# Patient Record
Sex: FEMALE | Race: WHITE | URBAN - METROPOLITAN AREA
[De-identification: names, ages, dates, MRNs, and addresses within clinical notes are randomized per-mention and may not be internally consistent; named-entity substitution may affect disease eponyms.]

---

## 2019-09-23 ENCOUNTER — HISTORICAL (OUTPATIENT)
Dept: ADMINISTRATIVE | Facility: HOSPITAL | Age: 59
End: 2019-09-23

## 2019-10-28 LAB — RAPID GROUP A STREP (OHS): NEGATIVE

## 2020-01-21 ENCOUNTER — HISTORICAL (OUTPATIENT)
Dept: PREADMISSION TESTING | Facility: HOSPITAL | Age: 60
End: 2020-01-21

## 2020-01-21 LAB
ABS NEUT (OLG): 4.87 X10(3)/MCL (ref 2.1–9.2)
ALBUMIN SERPL-MCNC: 3.3 GM/DL (ref 3.4–5)
ALBUMIN/GLOB SERPL: 0.9 {RATIO}
ALP SERPL-CCNC: 162 UNIT/L (ref 38–126)
ALT SERPL-CCNC: 34 UNIT/L (ref 12–78)
AST SERPL-CCNC: 28 UNIT/L (ref 15–37)
BASOPHILS # BLD AUTO: 0 X10(3)/MCL (ref 0–0.2)
BASOPHILS NFR BLD AUTO: 0 %
BILIRUB SERPL-MCNC: 0.6 MG/DL (ref 0.2–1)
BILIRUBIN DIRECT+TOT PNL SERPL-MCNC: 0.2 MG/DL (ref 0–0.2)
BILIRUBIN DIRECT+TOT PNL SERPL-MCNC: 0.4 MG/DL (ref 0–0.8)
BUN SERPL-MCNC: 9 MG/DL (ref 7–18)
CALCIUM SERPL-MCNC: 8.9 MG/DL (ref 8.5–10.1)
CHLORIDE SERPL-SCNC: 99 MMOL/L (ref 98–107)
CO2 SERPL-SCNC: 31 MMOL/L (ref 21–32)
CREAT SERPL-MCNC: 0.66 MG/DL (ref 0.55–1.02)
EOSINOPHIL # BLD AUTO: 0 X10(3)/MCL (ref 0–0.9)
EOSINOPHIL NFR BLD AUTO: 0 %
ERYTHROCYTE [DISTWIDTH] IN BLOOD BY AUTOMATED COUNT: 20.1 % (ref 11.5–17)
GLOBULIN SER-MCNC: 3.6 GM/DL (ref 2.4–3.5)
GLUCOSE SERPL-MCNC: 100 MG/DL (ref 74–106)
HCT VFR BLD AUTO: 50.1 % (ref 37–47)
HGB BLD-MCNC: 15.5 GM/DL (ref 12–16)
LYMPHOCYTES # BLD AUTO: 1.5 X10(3)/MCL (ref 0.6–4.6)
LYMPHOCYTES NFR BLD AUTO: 21 %
MCH RBC QN AUTO: 27.3 PG (ref 27–31)
MCHC RBC AUTO-ENTMCNC: 30.9 GM/DL (ref 33–36)
MCV RBC AUTO: 88.2 FL (ref 80–94)
MONOCYTES # BLD AUTO: 0.9 X10(3)/MCL (ref 0.1–1.3)
MONOCYTES NFR BLD AUTO: 12 %
NEUTROPHILS # BLD AUTO: 4.87 X10(3)/MCL (ref 2.1–9.2)
NEUTROPHILS NFR BLD AUTO: 66 %
PLATELET # BLD AUTO: 271 X10(3)/MCL (ref 130–400)
PMV BLD AUTO: 9.5 FL (ref 9.4–12.4)
POTASSIUM SERPL-SCNC: 3.9 MMOL/L (ref 3.5–5.1)
PROT SERPL-MCNC: 6.9 GM/DL (ref 6.4–8.2)
RBC # BLD AUTO: 5.68 X10(6)/MCL (ref 4.2–5.4)
SODIUM SERPL-SCNC: 137 MMOL/L (ref 136–145)
WBC # SPEC AUTO: 7.4 X10(3)/MCL (ref 4.5–11.5)

## 2020-07-14 ENCOUNTER — HISTORICAL (OUTPATIENT)
Dept: RESPIRATORY THERAPY | Facility: HOSPITAL | Age: 60
End: 2020-07-14

## 2020-10-14 ENCOUNTER — HISTORICAL (OUTPATIENT)
Dept: ADMINISTRATIVE | Facility: HOSPITAL | Age: 60
End: 2020-10-14

## 2020-10-14 LAB
ABS NEUT (OLG): 5.85 X10(3)/MCL (ref 2.1–9.2)
BASOPHILS # BLD AUTO: 0 X10(3)/MCL (ref 0–0.2)
BASOPHILS NFR BLD AUTO: 0.1 %
EOSINOPHIL # BLD AUTO: 0.1 X10(3)/MCL (ref 0–0.9)
EOSINOPHIL NFR BLD AUTO: 1 %
ERYTHROCYTE [DISTWIDTH] IN BLOOD BY AUTOMATED COUNT: 13.1 % (ref 11.5–17)
HCT VFR BLD AUTO: 45.8 % (ref 37–47)
HGB BLD-MCNC: 15.5 GM/DL (ref 12–16)
LYMPHOCYTES # BLD AUTO: 1.9 X10(3)/MCL (ref 0.6–4.6)
LYMPHOCYTES NFR BLD AUTO: 21.5 %
MCH RBC QN AUTO: 31.9 PG (ref 27–31)
MCHC RBC AUTO-ENTMCNC: 33.8 GM/DL (ref 33–36)
MCV RBC AUTO: 94.2 FL (ref 80–94)
MONOCYTES # BLD AUTO: 0.9 X10(3)/MCL (ref 0.1–1.3)
MONOCYTES NFR BLD AUTO: 10 %
NEUTROPHILS # BLD AUTO: 5.8 X10(3)/MCL (ref 2.1–9.2)
NEUTROPHILS NFR BLD AUTO: 67.3 %
PLATELET # BLD AUTO: 228 X10(3)/MCL (ref 130–400)
PMV BLD AUTO: 8.3 FL (ref 9.4–12.4)
RBC # BLD AUTO: 4.86 X10(6)/MCL (ref 4.2–5.4)
WBC # SPEC AUTO: 8.7 X10(3)/MCL (ref 4.5–11.5)

## 2021-05-25 ENCOUNTER — HISTORICAL (OUTPATIENT)
Dept: ADMINISTRATIVE | Facility: HOSPITAL | Age: 61
End: 2021-05-25

## 2021-05-25 LAB
ABS NEUT (OLG): 6.47 X10(3)/MCL (ref 2.1–9.2)
BASOPHILS # BLD AUTO: 0 X10(3)/MCL (ref 0–0.2)
BASOPHILS NFR BLD AUTO: 0 %
EOSINOPHIL # BLD AUTO: 0.1 X10(3)/MCL (ref 0–0.9)
EOSINOPHIL NFR BLD AUTO: 1.4 %
ERYTHROCYTE [DISTWIDTH] IN BLOOD BY AUTOMATED COUNT: 12.9 % (ref 11.5–17)
HCT VFR BLD AUTO: 51.2 % (ref 37–47)
HGB BLD-MCNC: 17.1 GM/DL (ref 12–16)
LYMPHOCYTES # BLD AUTO: 1.3 X10(3)/MCL (ref 0.6–4.6)
LYMPHOCYTES NFR BLD AUTO: 14.6 %
MCH RBC QN AUTO: 32.3 PG (ref 27–31)
MCHC RBC AUTO-ENTMCNC: 33.4 GM/DL (ref 33–36)
MCV RBC AUTO: 96.6 FL (ref 80–94)
MONOCYTES # BLD AUTO: 0.9 X10(3)/MCL (ref 0.1–1.3)
MONOCYTES NFR BLD AUTO: 9.9 %
NEUTROPHILS # BLD AUTO: 6.5 X10(3)/MCL (ref 2.1–9.2)
NEUTROPHILS NFR BLD AUTO: 73.9 %
PLATELET # BLD AUTO: 189 X10(3)/MCL (ref 130–400)
PMV BLD AUTO: 8.5 FL (ref 9.4–12.4)
RBC # BLD AUTO: 5.3 X10(6)/MCL (ref 4.2–5.4)
WBC # SPEC AUTO: 8.8 X10(3)/MCL (ref 4.5–11.5)

## 2022-04-09 ENCOUNTER — HISTORICAL (OUTPATIENT)
Dept: ADMINISTRATIVE | Facility: HOSPITAL | Age: 62
End: 2022-04-09

## 2022-04-27 VITALS
DIASTOLIC BLOOD PRESSURE: 66 MMHG | SYSTOLIC BLOOD PRESSURE: 121 MMHG | BODY MASS INDEX: 22.77 KG/M2 | OXYGEN SATURATION: 92 % | HEIGHT: 67 IN | WEIGHT: 145.06 LBS

## 2022-04-30 NOTE — PROGRESS NOTES
Patient:   Shaniqua Rodriguez            MRN: [id number]            FIN: [id number]               Age:   60 years     Sex:  Female     :  1960   Associated Diagnoses:   Polycythemia   Author:   Yuli Rich MD      Referring physician: Dr. Kate Almeida  Reason for Referral: Polycythemia    Secondary Polycythemia--2020    H/H:  20--16.9/50.2  20--16.7/48.5  20--16.5/47.1  10/14/20--15.5/45.8  21--17.1/51.2    Work-up:  10/14/20--erythropoietin level 6.1, JAK2 V617F, Exons 12-15, CALR, MPL mutations negative.         Visit Information   Visit type:  Scheduled follow-up.    Accompanied by:  No one.       Chief Complaint   2021 8:59 CDT       scheduled follow-up visit        Interval History   Current complaint.   Patient presents for follow-up of secondary polycythemia. She is still smoking, though trying to quit. She reports having another CT chest to check a lung nodule soon, ordered by Dr. Almeida. Denies any increasing SOB. H/H is higher today. O2 saturation is low. She does not have a pulmonologist that she follows with.      Review of Systems   Constitutional:  No fever, No chills, No weakness, No fatigue.    Eye:  No recent visual problem.    Ear/Nose/Mouth/Throat:  No decreased hearing, No nasal congestion, No sore throat.    Respiratory:  No shortness of breath, No cough, No wheezing.    Cardiovascular:  No chest pain, No palpitations, No peripheral edema.    Gastrointestinal:  No nausea, No vomiting, No diarrhea, No constipation, No abdominal pain.    Hematology/Lymphatics:  No bruising tendency, No bleeding tendency.    Musculoskeletal:  No back pain, No joint pain.    Integumentary:  No rash, No skin lesion.    Neurologic:  No confusion, No headache.    Psychiatric:  No anxiety, No depression.    All other systems are negative      Health Status   Allergies:    Allergies (2) Active Reaction  sulfa drugs Hives  Zofran swelling     Current medications:  (Selected)    Prescriptions  Prescribed  ProAir HFA 90 mcg/inh inhalation aerosol with adapter: 2 puff(s), INH, q4hr, PRN PRN for wheezing, # 1 EA, 0 Refill(s), [id number]  Documented Medications  Documented  Suboxone 8 mg-2 mg sublingual film: SL, Daily  venlafaxine 75 mg oral capsule, extended release: 75 mg = 1 cap(s), Oral, Daily   Problem list:    Active Problems (3)  Anxiety depression   Polycythemia   Tobacco user         Histories   Past Medical History:    Resolved  COPD (Chronic Obstructive Pulmonary Disease) Assessment Test scale ([id number]):  Resolved.   Family History:    Entire family history is negative.   Procedure history:    Mammogram in 2020 at 59 Years.  Colonoscopy in 2020 at 59 Years.  Laparoscopy Exploratory on 9/23/2019 at 58 Years.  Comments:  9/23/2019 21:02 Maeve Nunes RN  auto-populated from documented surgical case  Exploration Laparotomy on 9/23/2019 at 58 Years.  Comments:  9/23/2019 21:02 Maeve Nunes RN  auto-populated from documented surgical case  Ulna nerve procedure in 2000 at 40 Years.   Social History        Social & Psychosocial Habits    Alcohol  09/23/2019  Use: Current    Frequency: 1-2 times per month    Substance Use  09/23/2019  Use: Past    Type: Prescription medications    Tobacco  05/25/2021  Use: 4 or less cigarettes(less    Patient Wants Consult For Cessation Counseling No    Type: Cigarettes    Tobacco use per day: 2    Number of years: 10    Abuse/Neglect  05/25/2021  SHX Any signs of abuse or neglect No  .        Physical Examination      Vital Signs (last 24 hrs)_____  Last Charted___________  Temp Oral     36.9 DegC  (MAY 25 08:59)  Heart Rate Peripheral   97 bpm  (MAY 25 08:59)  SBP      H 163mmHg  (MAY 25 08:59)  DBP      78 mmHg  (MAY 25 08:59)  SpO2      L 88%  (MAY 25 08:59)  Weight      60.2 kg  (MAY 25 08:59)  Height      170 cm  (MAY 25 08:59)  BMI      20.83  (MAY 25 08:59)     General:  Alert and  oriented, No acute distress, thin white female.    Eye:  Vision unchanged.    HENT:  Normocephalic, Normal hearing, Oral mucosa is moist.    Neck:  Supple, No jugular venous distention, No lymphadenopathy, No thyromegaly.    Respiratory:  Lungs are clear to auscultation, decreased breath sounds bilaterally, no wheezing.    Cardiovascular:  Normal rate, Regular rhythm, No murmur, No gallop, Normal peripheral perfusion, No edema.    Gastrointestinal:  Soft, Non-tender, Non-distended, Normal bowel sounds, No organomegaly, +ventral abdominal hernia noted.    Lymphatics:  No lymphadenopathy neck, axilla, groin.    Musculoskeletal:  Normal range of motion, Normal strength, No deformity, Normal gait.    Integumentary:  Warm, Intact.    Neurologic:  Alert, Oriented, Normal sensory, Normal motor function, mild tremors.    Psychiatric:  Cooperative, Appropriate mood & affect.       Review / Management   Results review:  H/H 17.1/51.2.       Impression and Plan   Diagnosis     Polycythemia ([id number] D75.1).     Plan   Patient with mild polycythemia, secondary to COPD and h/o heavy tobacco abuse.  Epo level normal and JAK2, CALR, and MPL negative. I do not suspect primary bone marrow process.    H/H is higher today. Suspect due to ongoing COPD/tobacco abuse. O2 saturation is also low, may be staying low at home.  Patient is asymptomatic.   Will refer to pulmonology for evaluation.    She will continue trying to completely quit smoking.  RTC 6 months for follow-up with repeat CBCdiff.  If good at that time, can release from clinic.    All questions answered at this time.    Yuli Rich MD

## 2022-04-30 NOTE — PROGRESS NOTES
"   Patient:   Shaniqua Rodriguez            MRN: [id number]            FIN: [id number]               Age:   59 years     Sex:  Female     :  1960   Associated Diagnoses:   Polycythemia   Author:   Yuli Rich MD      Referring physician: Dr. Kate Almeida  Reason for Referral: Polycythemia    Polycythemia--2020    H/H:  20--16.9/50.2  20--16.7/48.5  20--16.5/47.1      Visit Information   Visit type:  New patient evaluation.    Accompanied by:  No one.       Chief Complaint   10/14/2020 13:15 CDT     No c/c        Interval History   Current complaint.   60 yo wf found on routine labs to have mild polycythemia. She is a smoker, heavy in the past but has cut back to 2 per day now. She has known COPD. She had a screening lung CT this year at [facility] and says they saw a "spot" and want to repeat in 1 year. She denies any CP or SOB. She has had extreme weight loss in the last 1 year s/p bowel resection for a cecal volvulus. She is finally putting back on some weight now.      Review of Systems   Constitutional:  +weight loss, No fever, No chills, No weakness, No fatigue.    Eye:  No recent visual problem.    Ear/Nose/Mouth/Throat:  No decreased hearing, No nasal congestion, No sore throat.    Respiratory:  No shortness of breath, No cough, No wheezing.    Cardiovascular:  No chest pain, No palpitations, No peripheral edema.    Gastrointestinal:  No nausea, No vomiting, No diarrhea, No constipation, No abdominal pain.    Hematology/Lymphatics:  No bruising tendency, No bleeding tendency.    Musculoskeletal:  No back pain, No joint pain.    Integumentary:  No rash, No skin lesion.    Neurologic:  No confusion, No headache.    Psychiatric:  No anxiety, No depression.    All other systems are negative      Health Status   Allergies:    Allergies (2) Active Reaction  sulfa drugs Hives  Zofran swelling     Current medications:  (Selected)   Prescriptions  Prescribed  ProAir HFA 90 mcg/inh " inhalation aerosol with adapter: 2 puff(s), INH, q4hr, PRN PRN for wheezing, # 1 EA, 0 Refill(s), Pharmacy: [facility] #[id number]  gabapentin 100 mg oral capsule: 100 mg = 1 cap(s), Oral, TID, # 42 cap(s), 0 Refill(s), Pharmacy: [facility] #[id number]  Documented Medications  Documented  Colace 100 mg oral capsule: 100 mg = 1 cap(s), Oral, BID, 0 Refill(s)  Suboxone 8 mg-2 mg sublingual film: SL, Daily  clonazePAM 0.5 mg oral tablet: 0.5 mg = 1 tab(s), Oral, BID  megestrol 40 mg/mL oral suspension: 800 mg = 20 mL, Oral, Daily  metoclopramide 5 mg oral tablet: 5 mg = 1 tab(s), Oral, qPM  polyethylene glycol 3350 ( MiraLax ): 17 gm, Oral, Daily, dissolve in water before taking, 0 Refill(s)  venlafaxine 37.5 mg oral capsule, extended release: 37.5 mg = 1 cap(s), Oral, qAM  venlafaxine 75 mg oral capsule, extended release: 75 mg = 1 cap(s), Oral, Daily      Histories   Past Medical History:    Resolved  COPD (Chronic Obstructive Pulmonary Disease) Assessment Test scale ([id number]):  Resolved.   Family History:    Entire family history is negative.   Procedure history:    Laparoscopy Exploratory on 9/23/2019 at 58 Years.  Comments:  9/23/2019 21:02 Maeve Nunes RN  auto-populated from documented surgical case  Exploration Laparotomy on 9/23/2019 at 58 Years.  Comments:  9/23/2019 21:02 Maeve Nunes RN  auto-populated from documented surgical case  Ulna nerve procedure in 2000 at 40 Years.   Social History        Social & Psychosocial Habits    Alcohol  09/23/2019  Use: Current    Frequency: 1-2 times per month    Substance Use  09/23/2019  Use: Past    Type: Prescription medications    Tobacco  10/28/2019  Use: 4 or less cigarettes(less    Type: Cigarettes    Patient Wants Consult For Cessation Counseling No    Tobacco use per day: 2    Number of years: 10    Abuse/Neglect  10/28/2019  SHX Any signs of abuse or neglect No  .        Physical Examination   Vital Signs    10/14/2020 13:15 CDT     Temperature Oral          36.8 degC                             Temperature Oral (calculated)             98.24 DegF                             Peripheral Pulse Rate     73 bpm                             Respiratory Rate          20 br/min                             SpO2                      98 %                             Oxygen Therapy            Room air                             Systolic Blood Pressure   189 mmHg  HI                             Diastolic Blood Pressure  96 mmHg  HI                             Blood Pressure Location   Left arm                             Manual Cuff BP            No     General:  Alert and oriented, No acute distress, thin white female.    Eye:  Vision unchanged.    HENT:  Normocephalic, Normal hearing, Oral mucosa is moist.    Neck:  Supple, No jugular venous distention, No lymphadenopathy, No thyromegaly.    Respiratory:  Lungs are clear to auscultation, Breath sounds are equal.    Cardiovascular:  Normal rate, Regular rhythm, No murmur, No gallop, Normal peripheral perfusion, No edema.    Gastrointestinal:  Soft, Non-tender, Non-distended, Normal bowel sounds, No organomegaly, +ventral abdominal hernia noted.    Lymphatics:  No lymphadenopathy neck, axilla, groin.    Musculoskeletal:  Normal range of motion, Normal strength, No deformity, Normal gait.    Integumentary:  Warm, Intact.    Neurologic:  Alert, Oriented, Normal sensory, Normal motor function, mild tremors.    Psychiatric:  Cooperative, Appropriate mood & affect.       Review / Management   Laboratory Results   pending for today      Impression and Plan   Diagnosis     Polycythemia ([id number] D75.1).     Plan   Patient with mild polycythemia, suspect secondary to COPD and h/o heavy tobacco abuse.  Will check Epo level and JAK2 to r/o primary polycythemia.  Will also obtain CT chest report done at [facility] 3 weeks for follow-up of testing.    All questions answered at this  time.    Yuli Rich MD

## 2022-09-16 ENCOUNTER — HISTORICAL (OUTPATIENT)
Dept: ADMINISTRATIVE | Facility: HOSPITAL | Age: 62
End: 2022-09-16

## 2025-03-26 ENCOUNTER — HOSPITAL ENCOUNTER (INPATIENT)
Facility: HOSPITAL | Age: 65
LOS: 2 days | Discharge: HOME | DRG: 189 | End: 2025-03-29
Attending: STUDENT IN AN ORGANIZED HEALTH CARE EDUCATION/TRAINING PROGRAM | Admitting: STUDENT IN AN ORGANIZED HEALTH CARE EDUCATION/TRAINING PROGRAM
Payer: COMMERCIAL

## 2025-03-26 DIAGNOSIS — J44.1 COPD EXACERBATION: Primary | ICD-10-CM

## 2025-03-26 DIAGNOSIS — Z13.6 SCREENING FOR CARDIOVASCULAR CONDITION: ICD-10-CM

## 2025-03-26 DIAGNOSIS — R79.89 ELEVATED BRAIN NATRIURETIC PEPTIDE (BNP) LEVEL: ICD-10-CM

## 2025-03-26 DIAGNOSIS — A41.9 SEPSIS, DUE TO UNSPECIFIED ORGANISM, UNSPECIFIED WHETHER ACUTE ORGAN DYSFUNCTION PRESENT: ICD-10-CM

## 2025-03-26 DIAGNOSIS — R53.1 GENERALIZED WEAKNESS: ICD-10-CM

## 2025-03-26 DIAGNOSIS — R65.10 SIRS (SYSTEMIC INFLAMMATORY RESPONSE SYNDROME): ICD-10-CM

## 2025-03-26 DIAGNOSIS — R07.9 CHEST PAIN: ICD-10-CM

## 2025-03-26 DIAGNOSIS — J18.9 ATYPICAL PNEUMONIA: ICD-10-CM

## 2025-03-26 LAB
ABS NEUT (OLG): 13.97 X10(3)/MCL (ref 2.1–9.2)
ALBUMIN SERPL-MCNC: 2.9 G/DL (ref 3.4–4.8)
ALBUMIN/GLOB SERPL: 0.6 RATIO (ref 1.1–2)
ALLENS TEST BLOOD GAS (OHS): YES
ALP SERPL-CCNC: 132 UNIT/L (ref 40–150)
ALT SERPL-CCNC: 14 UNIT/L (ref 0–55)
ANION GAP SERPL CALC-SCNC: 14 MEQ/L
AST SERPL-CCNC: 22 UNIT/L (ref 11–45)
BACTERIA #/AREA URNS AUTO: ABNORMAL /HPF
BASE EXCESS BLD CALC-SCNC: 3.8 MMOL/L (ref -2–2)
BILIRUB SERPL-MCNC: 0.3 MG/DL
BILIRUB UR QL STRIP.AUTO: NEGATIVE
BLOOD GAS SAMPLE TYPE (OHS): ABNORMAL
BUN SERPL-MCNC: 26.6 MG/DL (ref 9.8–20.1)
CA-I BLD-SCNC: 1.2 MMOL/L (ref 1.12–1.23)
CALCIUM SERPL-MCNC: 9.9 MG/DL (ref 8.4–10.2)
CHLORIDE SERPL-SCNC: 96 MMOL/L (ref 98–107)
CLARITY UR: ABNORMAL
CO2 BLDA-SCNC: 37.8 MMOL/L
CO2 SERPL-SCNC: 29 MMOL/L (ref 23–31)
COHGB MFR BLDA: 2.9 % (ref 0.5–1.5)
COLOR UR AUTO: YELLOW
CREAT SERPL-MCNC: 0.79 MG/DL (ref 0.55–1.02)
CREAT/UREA NIT SERPL: 34
DRAWN BY BLOOD GAS (OHS): ABNORMAL
ERYTHROCYTE [DISTWIDTH] IN BLOOD BY AUTOMATED COUNT: 13.4 % (ref 11.5–17)
FLUAV AG UPPER RESP QL IA.RAPID: NOT DETECTED
FLUBV AG UPPER RESP QL IA.RAPID: NOT DETECTED
GFR SERPLBLD CREATININE-BSD FMLA CKD-EPI: >60 ML/MIN/1.73/M2
GLOBULIN SER-MCNC: 5.1 GM/DL (ref 2.4–3.5)
GLUCOSE SERPL-MCNC: 141 MG/DL (ref 82–115)
GLUCOSE UR QL STRIP: NORMAL
HCO3 BLDA-SCNC: 35.2 MMOL/L (ref 22–26)
HCT VFR BLD AUTO: 54 % (ref 37–47)
HGB BLD-MCNC: 17.5 G/DL (ref 12–16)
HGB UR QL STRIP: ABNORMAL
HYALINE CASTS #/AREA URNS LPF: ABNORMAL /LPF
INSTRUMENT WBC (OLG): 16.25 X10(3)/MCL
KETONES UR QL STRIP: NEGATIVE
LACTATE SERPL-SCNC: 2.1 MMOL/L (ref 0.5–2.2)
LACTATE SERPL-SCNC: 2.4 MMOL/L (ref 0.5–2.2)
LEUKOCYTE ESTERASE UR QL STRIP: NEGATIVE
LPM (OHS): 8
LYMPHOCYTES NFR BLD MANUAL: 0.81 X10(3)/MCL (ref 0.6–4.6)
LYMPHOCYTES NFR BLD MANUAL: 5 %
MAGNESIUM SERPL-MCNC: 2.1 MG/DL (ref 1.6–2.6)
MCH RBC QN AUTO: 31 PG (ref 27–31)
MCHC RBC AUTO-ENTMCNC: 32.4 G/DL (ref 33–36)
MCV RBC AUTO: 95.7 FL (ref 80–94)
METHGB MFR BLDA: 1.1 % (ref 0.4–1.5)
MONOCYTES NFR BLD MANUAL: 1.46 X10(3)/MCL (ref 0.1–1.3)
MONOCYTES NFR BLD MANUAL: 9 %
MUCOUS THREADS URNS QL MICRO: ABNORMAL /LPF
NEUTROPHILS NFR BLD MANUAL: 86 %
NITRITE UR QL STRIP: NEGATIVE
O2 HB BLOOD GAS (OHS): 95 % (ref 94–97)
OHS QRS DURATION: 88 MS
OHS QTC CALCULATION: 481 MS
OXYGEN DEVICE BLOOD GAS (OHS): ABNORMAL
OXYHGB MFR BLDA: 16.9 G/DL (ref 12–16)
PCO2 BLDA: 84 MMHG (ref 35–45)
PH BLDA: 7.23 [PH] (ref 7.35–7.45)
PH UR STRIP: 6 [PH]
PLATELET # BLD AUTO: 217 X10(3)/MCL (ref 130–400)
PLATELET # BLD EST: NORMAL 10*3/UL
PMV BLD AUTO: 8.8 FL (ref 7.4–10.4)
PO2 BLDA: 101 MMHG (ref 80–100)
POTASSIUM BLOOD GAS (OHS): 4.2 MMOL/L (ref 3.5–5)
POTASSIUM SERPL-SCNC: 3.9 MMOL/L (ref 3.5–5.1)
PROT SERPL-MCNC: 8 GM/DL (ref 5.8–7.6)
PROT UR QL STRIP: ABNORMAL
RBC # BLD AUTO: 5.64 X10(6)/MCL (ref 4.2–5.4)
RBC #/AREA URNS AUTO: ABNORMAL /HPF
RBC MORPH BLD: NORMAL
RSV A 5' UTR RNA NPH QL NAA+PROBE: NOT DETECTED
SAMPLE SITE BLOOD GAS (OHS): ABNORMAL
SAO2 % BLDA: 96.6 %
SARS-COV-2 RNA RESP QL NAA+PROBE: NOT DETECTED
SODIUM BLOOD GAS (OHS): 134 MMOL/L (ref 137–145)
SODIUM SERPL-SCNC: 139 MMOL/L (ref 136–145)
SP GR UR STRIP.AUTO: 1.03 (ref 1–1.03)
SQUAMOUS #/AREA URNS LPF: ABNORMAL /HPF
TSH SERPL-ACNC: 2.65 UIU/ML (ref 0.35–4.94)
UROBILINOGEN UR STRIP-ACNC: 2
WBC # BLD AUTO: 16.25 X10(3)/MCL (ref 4.5–11.5)
WBC #/AREA URNS AUTO: ABNORMAL /HPF

## 2025-03-26 PROCEDURE — 93010 ELECTROCARDIOGRAM REPORT: CPT | Mod: ,,, | Performed by: INTERNAL MEDICINE

## 2025-03-26 PROCEDURE — 84443 ASSAY THYROID STIM HORMONE: CPT | Performed by: STUDENT IN AN ORGANIZED HEALTH CARE EDUCATION/TRAINING PROGRAM

## 2025-03-26 PROCEDURE — 80053 COMPREHEN METABOLIC PANEL: CPT | Performed by: PHYSICIAN ASSISTANT

## 2025-03-26 PROCEDURE — 96361 HYDRATE IV INFUSION ADD-ON: CPT

## 2025-03-26 PROCEDURE — 96365 THER/PROPH/DIAG IV INF INIT: CPT

## 2025-03-26 PROCEDURE — 27000190 HC CPAP FULL FACE MASK W/VALVE

## 2025-03-26 PROCEDURE — 96375 TX/PRO/DX INJ NEW DRUG ADDON: CPT

## 2025-03-26 PROCEDURE — 99285 EMERGENCY DEPT VISIT HI MDM: CPT | Mod: 25

## 2025-03-26 PROCEDURE — 63600175 PHARM REV CODE 636 W HCPCS: Performed by: STUDENT IN AN ORGANIZED HEALTH CARE EDUCATION/TRAINING PROGRAM

## 2025-03-26 PROCEDURE — 87040 BLOOD CULTURE FOR BACTERIA: CPT | Performed by: PHYSICIAN ASSISTANT

## 2025-03-26 PROCEDURE — 81001 URINALYSIS AUTO W/SCOPE: CPT | Performed by: PHYSICIAN ASSISTANT

## 2025-03-26 PROCEDURE — 83735 ASSAY OF MAGNESIUM: CPT | Performed by: PHYSICIAN ASSISTANT

## 2025-03-26 PROCEDURE — 94760 N-INVAS EAR/PLS OXIMETRY 1: CPT | Mod: XB

## 2025-03-26 PROCEDURE — 5A09357 ASSISTANCE WITH RESPIRATORY VENTILATION, LESS THAN 24 CONSECUTIVE HOURS, CONTINUOUS POSITIVE AIRWAY PRESSURE: ICD-10-PCS | Performed by: STUDENT IN AN ORGANIZED HEALTH CARE EDUCATION/TRAINING PROGRAM

## 2025-03-26 PROCEDURE — 85027 COMPLETE CBC AUTOMATED: CPT | Performed by: PHYSICIAN ASSISTANT

## 2025-03-26 PROCEDURE — 25000003 PHARM REV CODE 250: Performed by: STUDENT IN AN ORGANIZED HEALTH CARE EDUCATION/TRAINING PROGRAM

## 2025-03-26 PROCEDURE — 27100171 HC OXYGEN HIGH FLOW UP TO 24 HOURS

## 2025-03-26 PROCEDURE — 94644 CONT INHLJ TX 1ST HOUR: CPT

## 2025-03-26 PROCEDURE — 82803 BLOOD GASES ANY COMBINATION: CPT

## 2025-03-26 PROCEDURE — 87581 M.PNEUMON DNA AMP PROBE: CPT | Performed by: INTERNAL MEDICINE

## 2025-03-26 PROCEDURE — 63600175 PHARM REV CODE 636 W HCPCS: Performed by: PHYSICIAN ASSISTANT

## 2025-03-26 PROCEDURE — 94761 N-INVAS EAR/PLS OXIMETRY MLT: CPT | Mod: XB

## 2025-03-26 PROCEDURE — 27000221 HC OXYGEN, UP TO 24 HOURS

## 2025-03-26 PROCEDURE — 36600 WITHDRAWAL OF ARTERIAL BLOOD: CPT

## 2025-03-26 PROCEDURE — 99900035 HC TECH TIME PER 15 MIN (STAT)

## 2025-03-26 PROCEDURE — 0241U COVID/RSV/FLU A&B PCR: CPT | Performed by: PHYSICIAN ASSISTANT

## 2025-03-26 PROCEDURE — 93005 ELECTROCARDIOGRAM TRACING: CPT

## 2025-03-26 PROCEDURE — 94660 CPAP INITIATION&MGMT: CPT

## 2025-03-26 PROCEDURE — 25000242 PHARM REV CODE 250 ALT 637 W/ HCPCS: Performed by: STUDENT IN AN ORGANIZED HEALTH CARE EDUCATION/TRAINING PROGRAM

## 2025-03-26 PROCEDURE — 99900031 HC PATIENT EDUCATION (STAT)

## 2025-03-26 PROCEDURE — 94645 CONT INHLJ TX EACH ADDL HOUR: CPT

## 2025-03-26 PROCEDURE — 83605 ASSAY OF LACTIC ACID: CPT | Performed by: PHYSICIAN ASSISTANT

## 2025-03-26 PROCEDURE — 25500020 PHARM REV CODE 255: Performed by: STUDENT IN AN ORGANIZED HEALTH CARE EDUCATION/TRAINING PROGRAM

## 2025-03-26 RX ORDER — METHYLPREDNISOLONE SOD SUCC 125 MG
125 VIAL (EA) INJECTION
Status: COMPLETED | OUTPATIENT
Start: 2025-03-26 | End: 2025-03-26

## 2025-03-26 RX ORDER — ALBUTEROL SULFATE 0.83 MG/ML
10 SOLUTION RESPIRATORY (INHALATION)
Status: COMPLETED | OUTPATIENT
Start: 2025-03-26 | End: 2025-03-26

## 2025-03-26 RX ORDER — IPRATROPIUM BROMIDE 0.5 MG/2.5ML
0.5 SOLUTION RESPIRATORY (INHALATION)
Status: COMPLETED | OUTPATIENT
Start: 2025-03-26 | End: 2025-03-26

## 2025-03-26 RX ORDER — CEFTRIAXONE 1 G/1
1 INJECTION, POWDER, FOR SOLUTION INTRAMUSCULAR; INTRAVENOUS
Status: COMPLETED | OUTPATIENT
Start: 2025-03-26 | End: 2025-03-26

## 2025-03-26 RX ADMIN — METHYLPREDNISOLONE SODIUM SUCCINATE 125 MG: 125 INJECTION, POWDER, FOR SOLUTION INTRAMUSCULAR; INTRAVENOUS at 07:03

## 2025-03-26 RX ADMIN — CEFTRIAXONE SODIUM 1 G: 1 INJECTION, POWDER, FOR SOLUTION INTRAMUSCULAR; INTRAVENOUS at 07:03

## 2025-03-26 RX ADMIN — SODIUM CHLORIDE, POTASSIUM CHLORIDE, SODIUM LACTATE AND CALCIUM CHLORIDE 1770 ML: 600; 310; 30; 20 INJECTION, SOLUTION INTRAVENOUS at 06:03

## 2025-03-26 RX ADMIN — ALBUTEROL SULFATE 10 MG: 2.5 SOLUTION RESPIRATORY (INHALATION) at 07:03

## 2025-03-26 RX ADMIN — ALBUTEROL SULFATE 10 MG: 2.5 SOLUTION RESPIRATORY (INHALATION) at 09:03

## 2025-03-26 RX ADMIN — AZITHROMYCIN MONOHYDRATE 500 MG: 500 INJECTION, POWDER, LYOPHILIZED, FOR SOLUTION INTRAVENOUS at 07:03

## 2025-03-26 RX ADMIN — IOHEXOL 70 ML: 350 INJECTION, SOLUTION INTRAVENOUS at 08:03

## 2025-03-26 RX ADMIN — IPRATROPIUM BROMIDE 0.5 MG: 0.5 SOLUTION RESPIRATORY (INHALATION) at 07:03

## 2025-03-26 NOTE — FIRST PROVIDER EVALUATION
"Medical screening examination initiated.  I have conducted a focused provider triage encounter, findings are as follows:    Brief history of present illness:  64yoWF with history of COPD presents to the emergency department with cough and difficulty breathing for the last 3-4 days.  She has been running fever allow the weekend.  She takes over-the-counter medicine for COPD.  Patient seen at urgent care prior to arrival and told her oxygen was low.  Patient arrives on nasal cannula with at 94%.tmax at home 101F    Vitals:    03/26/25 1732   BP: (!) 149/54   Pulse: 90   Resp: (!) 26   Temp: 98.8 °F (37.1 °C)   TempSrc: Oral   SpO2: (!) 94%   Weight: 59 kg (130 lb)   Height: 5' 7" (1.702 m)       Pertinent physical exam:  NC on. Wet cough    Brief workup plan:  labs and imaging    Preliminary workup initiated; this workup will be continued and followed by the physician or advanced practice provider that is assigned to the patient when roomed.  "

## 2025-03-27 PROBLEM — J44.1 COPD EXACERBATION: Status: ACTIVE | Noted: 2025-03-27

## 2025-03-27 PROBLEM — J96.02 ACUTE RESPIRATORY FAILURE WITH HYPOXIA AND HYPERCAPNIA: Status: ACTIVE | Noted: 2025-03-27

## 2025-03-27 PROBLEM — J18.9 COMMUNITY ACQUIRED PNEUMONIA: Status: ACTIVE | Noted: 2025-03-27

## 2025-03-27 PROBLEM — A41.9 SEPSIS: Status: ACTIVE | Noted: 2025-03-27

## 2025-03-27 PROBLEM — J96.01 ACUTE RESPIRATORY FAILURE WITH HYPOXIA AND HYPERCAPNIA: Status: ACTIVE | Noted: 2025-03-27

## 2025-03-27 LAB
ALBUMIN SERPL-MCNC: 2.6 G/DL (ref 3.4–4.8)
ALBUMIN/GLOB SERPL: 0.8 RATIO (ref 1.1–2)
ALLENS TEST BLOOD GAS (OHS): NO
ALLENS TEST BLOOD GAS (OHS): YES
ALP SERPL-CCNC: 120 UNIT/L (ref 40–150)
ALT SERPL-CCNC: 15 UNIT/L (ref 0–55)
ANION GAP SERPL CALC-SCNC: 8 MEQ/L
APICAL FOUR CHAMBER EJECTION FRACTION: 64 %
APICAL TWO CHAMBER EJECTION FRACTION: 61 %
AST SERPL-CCNC: 19 UNIT/L (ref 11–45)
AV INDEX (PROSTH): 1.1
AV MEAN GRADIENT: 5 MMHG
AV PEAK GRADIENT: 10 MMHG
AV VALVE AREA BY VELOCITY RATIO: 2.7 CM²
AV VALVE AREA: 2.8 CM²
AV VELOCITY RATIO: 1.06
B PERT.PT PRMT NPH QL NAA+NON-PROBE: NOT DETECTED
BASE EXCESS BLD CALC-SCNC: 4.7 MMOL/L (ref -2–2)
BASE EXCESS BLD CALC-SCNC: 8.6 MMOL/L (ref -2–2)
BASOPHILS # BLD AUTO: 0.1 X10(3)/MCL
BASOPHILS NFR BLD AUTO: 0.9 %
BILIRUB SERPL-MCNC: 0.2 MG/DL
BIPAP(E) BLOOD GAS (OHS): 5 CM H2O
BIPAP(I) BLOOD GAS (OHS): 14 CM H2O
BLOOD GAS SAMPLE TYPE (OHS): ABNORMAL
BLOOD GAS SAMPLE TYPE (OHS): ABNORMAL
BNP BLD-MCNC: 821.9 PG/ML
BSA FOR ECHO PROCEDURE: 1.67 M2
BUN SERPL-MCNC: 24.6 MG/DL (ref 9.8–20.1)
C PNEUM DNA NPH QL NAA+NON-PROBE: NOT DETECTED
CA-I BLD-SCNC: 1.19 MMOL/L (ref 1.12–1.23)
CA-I BLD-SCNC: 1.22 MMOL/L (ref 1.12–1.23)
CALCIUM SERPL-MCNC: 8.8 MG/DL (ref 8.4–10.2)
CHLORIDE SERPL-SCNC: 99 MMOL/L (ref 98–107)
CO2 BLDA-SCNC: 35.8 MMOL/L
CO2 BLDA-SCNC: 38.1 MMOL/L
CO2 SERPL-SCNC: 33 MMOL/L (ref 23–31)
COHGB MFR BLDA: 2.2 % (ref 0.5–1.5)
COHGB MFR BLDA: 3 % (ref 0.5–1.5)
CREAT SERPL-MCNC: 0.66 MG/DL (ref 0.55–1.02)
CREAT/UREA NIT SERPL: 37
CV ECHO LV RWT: 0.73 CM
DOP CALC AO PEAK VEL: 1.6 M/S
DOP CALC AO VTI: 31.2 CM
DOP CALC LVOT AREA: 2.5 CM2
DOP CALC LVOT DIAMETER: 1.8 CM
DOP CALC LVOT PEAK VEL: 1.7 M/S
DOP CALC LVOT STROKE VOLUME: 87 CM3
DOP CALC MV VTI: 21 CM
DOP CALCLVOT PEAK VEL VTI: 34.2 CM
DRAWN BY BLOOD GAS (OHS): ABNORMAL
DRAWN BY BLOOD GAS (OHS): ABNORMAL
E WAVE DECELERATION TIME: 164 MSEC
E/A RATIO: 0.75
E/E' RATIO: 14 M/S
ECHO LV POSTERIOR WALL: 1.2 CM (ref 0.6–1.1)
EOSINOPHIL # BLD AUTO: 0.09 X10(3)/MCL (ref 0–0.9)
EOSINOPHIL NFR BLD AUTO: 0.8 %
EPAP (OHS): 5 CMH2O
ERYTHROCYTE [DISTWIDTH] IN BLOOD BY AUTOMATED COUNT: 13.5 % (ref 11.5–17)
FRACTIONAL SHORTENING: 45.5 % (ref 28–44)
GFR SERPLBLD CREATININE-BSD FMLA CKD-EPI: >60 ML/MIN/1.73/M2
GLOBULIN SER-MCNC: 3.4 GM/DL (ref 2.4–3.5)
GLUCOSE SERPL-MCNC: 191 MG/DL (ref 82–115)
HADV DNA NPH QL NAA+NON-PROBE: NOT DETECTED
HCO3 BLDA-SCNC: 33.7 MMOL/L (ref 22–26)
HCO3 BLDA-SCNC: 36.3 MMOL/L (ref 22–26)
HCOV 229E RNA NPH QL NAA+NON-PROBE: NOT DETECTED
HCOV HKU1 RNA NPH QL NAA+NON-PROBE: NOT DETECTED
HCOV NL63 RNA NPH QL NAA+NON-PROBE: NOT DETECTED
HCOV OC43 RNA NPH QL NAA+NON-PROBE: NOT DETECTED
HCT VFR BLD AUTO: 48.3 % (ref 37–47)
HGB BLD-MCNC: 15.6 G/DL (ref 12–16)
HMPV RNA NPH QL NAA+NON-PROBE: NOT DETECTED
HPIV1 RNA NPH QL NAA+NON-PROBE: NOT DETECTED
HPIV2 RNA NPH QL NAA+NON-PROBE: NOT DETECTED
HPIV3 RNA NPH QL NAA+NON-PROBE: NOT DETECTED
HPIV4 RNA NPH QL NAA+NON-PROBE: NOT DETECTED
IMM GRANULOCYTES # BLD AUTO: 0.09 X10(3)/MCL (ref 0–0.04)
IMM GRANULOCYTES NFR BLD AUTO: 0.8 %
INHALED O2 CONCENTRATION: 35 %
INHALED O2 CONCENTRATION: 45 %
INTERVENTRICULAR SEPTUM: 1.1 CM (ref 0.6–1.1)
IPAP (OHS): 14 CMH2O
LEFT ATRIUM AREA SYSTOLIC (APICAL 2 CHAMBER): 15.2 CM2
LEFT ATRIUM AREA SYSTOLIC (APICAL 4 CHAMBER): 11.6 CM2
LEFT ATRIUM SIZE: 2.9 CM
LEFT ATRIUM VOLUME INDEX MOD: 21 ML/M2
LEFT ATRIUM VOLUME MOD: 35 ML
LEFT INTERNAL DIMENSION IN SYSTOLE: 1.8 CM (ref 2.1–4)
LEFT VENTRICLE DIASTOLIC VOLUME INDEX: 26.19 ML/M2
LEFT VENTRICLE DIASTOLIC VOLUME: 44 ML
LEFT VENTRICLE END DIASTOLIC VOLUME APICAL 2 CHAMBER: 70.5 ML
LEFT VENTRICLE END DIASTOLIC VOLUME APICAL 4 CHAMBER: 50.5 ML
LEFT VENTRICLE END SYSTOLIC VOLUME APICAL 2 CHAMBER: 41.8 ML
LEFT VENTRICLE END SYSTOLIC VOLUME APICAL 4 CHAMBER: 27.3 ML
LEFT VENTRICLE MASS INDEX: 69.5 G/M2
LEFT VENTRICLE SYSTOLIC VOLUME INDEX: 6 ML/M2
LEFT VENTRICLE SYSTOLIC VOLUME: 10 ML
LEFT VENTRICULAR INTERNAL DIMENSION IN DIASTOLE: 3.3 CM (ref 3.5–6)
LEFT VENTRICULAR MASS: 116.8 G
LV LATERAL E/E' RATIO: 13.2 M/S
LV SEPTAL E/E' RATIO: 15.8 M/S
LVED V (TEICH): 44.1 ML
LVES V (TEICH): 9.72 ML
LVOT MG: 7 MMHG
LVOT MV: 1.21 CM/S
LYMPHOCYTES # BLD AUTO: 0.4 X10(3)/MCL (ref 0.6–4.6)
LYMPHOCYTES NFR BLD AUTO: 3.6 %
M PNEUMO DNA NPH QL NAA+NON-PROBE: NOT DETECTED
MAGNESIUM SERPL-MCNC: 2.7 MG/DL (ref 1.6–2.6)
MCH RBC QN AUTO: 31 PG (ref 27–31)
MCHC RBC AUTO-ENTMCNC: 32.3 G/DL (ref 33–36)
MCV RBC AUTO: 96 FL (ref 80–94)
METHGB MFR BLDA: 1.1 % (ref 0.4–1.5)
METHGB MFR BLDA: 1.1 % (ref 0.4–1.5)
MODE (OHS): ABNORMAL
MONOCYTES # BLD AUTO: 0.7 X10(3)/MCL (ref 0.1–1.3)
MONOCYTES NFR BLD AUTO: 6.3 %
MV MEAN GRADIENT: 2 MMHG
MV PEAK A VEL: 1.05 M/S
MV PEAK E VEL: 0.79 M/S
MV PEAK GRADIENT: 5 MMHG
MV VALVE AREA BY CONTINUITY EQUATION: 4.14 CM2
NEUTROPHILS # BLD AUTO: 9.72 X10(3)/MCL (ref 2.1–9.2)
NEUTROPHILS NFR BLD AUTO: 87.6 %
NRBC BLD AUTO-RTO: 0 %
O2 HB BLOOD GAS (OHS): 91.5 % (ref 94–97)
O2 HB BLOOD GAS (OHS): 93.9 % (ref 94–97)
OHS CV RV/LV RATIO: 0.85 CM
OHS LV EJECTION FRACTION SIMPSONS BIPLANE MOD: 64 %
OXYGEN DEVICE BLOOD GAS (OHS): ABNORMAL
OXYHGB MFR BLDA: 16.3 G/DL (ref 12–16)
OXYHGB MFR BLDA: 16.4 G/DL (ref 12–16)
PCO2 BLDA: 60 MMHG (ref 35–45)
PCO2 BLDA: 67 MMHG (ref 35–45)
PH BLDA: 7.31 [PH] (ref 7.35–7.45)
PH BLDA: 7.39 [PH] (ref 7.35–7.45)
PHOSPHATE SERPL-MCNC: 2.8 MG/DL (ref 2.3–4.7)
PISA TR MAX VEL: 2.5 M/S
PLATELET # BLD AUTO: 199 X10(3)/MCL (ref 130–400)
PMV BLD AUTO: 9 FL (ref 7.4–10.4)
PO2 BLDA: 66 MMHG (ref 80–100)
PO2 BLDA: 76 MMHG (ref 80–100)
POTASSIUM BLOOD GAS (OHS): 3.9 MMOL/L (ref 3.5–5)
POTASSIUM BLOOD GAS (OHS): 4.1 MMOL/L (ref 3.5–5)
POTASSIUM SERPL-SCNC: 4.3 MMOL/L (ref 3.5–5.1)
PROT SERPL-MCNC: 6 GM/DL (ref 5.8–7.6)
RA PRESSURE ESTIMATED: 8 MMHG
RBC # BLD AUTO: 5.03 X10(6)/MCL (ref 4.2–5.4)
RIGHT VENTRICLE DIASTOLIC BASEL DIMENSION: 2.8 CM
RIGHT VENTRICULAR END-DIASTOLIC DIMENSION: 2.8 CM
RSV RNA NPH QL NAA+NON-PROBE: NOT DETECTED
RV TB RVSP: 11 MMHG
RV+EV RNA NPH QL NAA+NON-PROBE: DETECTED
SAMPLE SITE BLOOD GAS (OHS): ABNORMAL
SAMPLE SITE BLOOD GAS (OHS): ABNORMAL
SAO2 % BLDA: 90.7 %
SAO2 % BLDA: 94.9 %
SODIUM BLOOD GAS (OHS): 133 MMOL/L (ref 137–145)
SODIUM BLOOD GAS (OHS): 135 MMOL/L (ref 137–145)
SODIUM SERPL-SCNC: 140 MMOL/L (ref 136–145)
TDI LATERAL: 0.06 M/S
TDI SEPTAL: 0.05 M/S
TDI: 0.06 M/S
TR MAX PG: 25 MMHG
TRICUSPID ANNULAR PLANE SYSTOLIC EXCURSION: 2.13 CM
TROPONIN I SERPL-MCNC: <0.01 NG/ML (ref 0–0.04)
TV REST PULMONARY ARTERY PRESSURE: 33 MMHG
WBC # BLD AUTO: 11.1 X10(3)/MCL (ref 4.5–11.5)
Z-SCORE OF LEFT VENTRICULAR DIMENSION IN END DIASTOLE: -3.51
Z-SCORE OF LEFT VENTRICULAR DIMENSION IN END SYSTOLE: -3.78

## 2025-03-27 PROCEDURE — 25000242 PHARM REV CODE 250 ALT 637 W/ HCPCS: Performed by: INTERNAL MEDICINE

## 2025-03-27 PROCEDURE — 36600 WITHDRAWAL OF ARTERIAL BLOOD: CPT

## 2025-03-27 PROCEDURE — 99900035 HC TECH TIME PER 15 MIN (STAT)

## 2025-03-27 PROCEDURE — 63600175 PHARM REV CODE 636 W HCPCS: Performed by: STUDENT IN AN ORGANIZED HEALTH CARE EDUCATION/TRAINING PROGRAM

## 2025-03-27 PROCEDURE — 25000003 PHARM REV CODE 250: Performed by: INTERNAL MEDICINE

## 2025-03-27 PROCEDURE — 82803 BLOOD GASES ANY COMBINATION: CPT

## 2025-03-27 PROCEDURE — 86480 TB TEST CELL IMMUN MEASURE: CPT | Performed by: STUDENT IN AN ORGANIZED HEALTH CARE EDUCATION/TRAINING PROGRAM

## 2025-03-27 PROCEDURE — 27000207 HC ISOLATION

## 2025-03-27 PROCEDURE — 94761 N-INVAS EAR/PLS OXIMETRY MLT: CPT | Mod: XB

## 2025-03-27 PROCEDURE — 99900031 HC PATIENT EDUCATION (STAT)

## 2025-03-27 PROCEDURE — 21400001 HC TELEMETRY ROOM

## 2025-03-27 PROCEDURE — 84484 ASSAY OF TROPONIN QUANT: CPT | Performed by: STUDENT IN AN ORGANIZED HEALTH CARE EDUCATION/TRAINING PROGRAM

## 2025-03-27 PROCEDURE — 83735 ASSAY OF MAGNESIUM: CPT | Performed by: NURSE PRACTITIONER

## 2025-03-27 PROCEDURE — 80053 COMPREHEN METABOLIC PANEL: CPT | Performed by: NURSE PRACTITIONER

## 2025-03-27 PROCEDURE — 83880 ASSAY OF NATRIURETIC PEPTIDE: CPT | Performed by: STUDENT IN AN ORGANIZED HEALTH CARE EDUCATION/TRAINING PROGRAM

## 2025-03-27 PROCEDURE — 25000003 PHARM REV CODE 250: Performed by: NURSE PRACTITIONER

## 2025-03-27 PROCEDURE — 84100 ASSAY OF PHOSPHORUS: CPT | Performed by: NURSE PRACTITIONER

## 2025-03-27 PROCEDURE — 85025 COMPLETE CBC W/AUTO DIFF WBC: CPT | Performed by: NURSE PRACTITIONER

## 2025-03-27 PROCEDURE — 94760 N-INVAS EAR/PLS OXIMETRY 1: CPT | Mod: XB

## 2025-03-27 PROCEDURE — 63600175 PHARM REV CODE 636 W HCPCS: Performed by: NURSE PRACTITIONER

## 2025-03-27 PROCEDURE — 94660 CPAP INITIATION&MGMT: CPT

## 2025-03-27 PROCEDURE — 63600175 PHARM REV CODE 636 W HCPCS: Mod: JZ,TB | Performed by: INTERNAL MEDICINE

## 2025-03-27 PROCEDURE — 11000001 HC ACUTE MED/SURG PRIVATE ROOM

## 2025-03-27 PROCEDURE — 27100171 HC OXYGEN HIGH FLOW UP TO 24 HOURS

## 2025-03-27 RX ORDER — SIMETHICONE 80 MG
1 TABLET,CHEWABLE ORAL 4 TIMES DAILY PRN
Status: DISCONTINUED | OUTPATIENT
Start: 2025-03-27 | End: 2025-03-29 | Stop reason: HOSPADM

## 2025-03-27 RX ORDER — MIRTAZAPINE 15 MG/1
15 TABLET, FILM COATED ORAL NIGHTLY
Status: DISCONTINUED | OUTPATIENT
Start: 2025-03-27 | End: 2025-03-29 | Stop reason: HOSPADM

## 2025-03-27 RX ORDER — MIRTAZAPINE 15 MG/1
15 TABLET, FILM COATED ORAL NIGHTLY
COMMUNITY

## 2025-03-27 RX ORDER — BUPRENORPHINE HYDROCHLORIDE 8 MG/1
8 TABLET SUBLINGUAL DAILY
Status: DISCONTINUED | OUTPATIENT
Start: 2025-03-27 | End: 2025-03-29 | Stop reason: HOSPADM

## 2025-03-27 RX ORDER — PROCHLORPERAZINE EDISYLATE 5 MG/ML
5 INJECTION INTRAMUSCULAR; INTRAVENOUS EVERY 6 HOURS PRN
Status: DISCONTINUED | OUTPATIENT
Start: 2025-03-27 | End: 2025-03-29 | Stop reason: HOSPADM

## 2025-03-27 RX ORDER — HYDRALAZINE HYDROCHLORIDE 20 MG/ML
10 INJECTION INTRAMUSCULAR; INTRAVENOUS EVERY 4 HOURS PRN
Status: DISCONTINUED | OUTPATIENT
Start: 2025-03-27 | End: 2025-03-29 | Stop reason: HOSPADM

## 2025-03-27 RX ORDER — ALBUTEROL SULFATE 90 UG/1
2 INHALANT RESPIRATORY (INHALATION) 4 TIMES DAILY
Status: DISCONTINUED | OUTPATIENT
Start: 2025-03-27 | End: 2025-03-29

## 2025-03-27 RX ORDER — ALUMINUM HYDROXIDE, MAGNESIUM HYDROXIDE, AND SIMETHICONE 1200; 120; 1200 MG/30ML; MG/30ML; MG/30ML
30 SUSPENSION ORAL 4 TIMES DAILY PRN
Status: DISCONTINUED | OUTPATIENT
Start: 2025-03-27 | End: 2025-03-29 | Stop reason: HOSPADM

## 2025-03-27 RX ORDER — ENOXAPARIN SODIUM 100 MG/ML
40 INJECTION SUBCUTANEOUS EVERY 24 HOURS
Status: DISCONTINUED | OUTPATIENT
Start: 2025-03-27 | End: 2025-03-29 | Stop reason: HOSPADM

## 2025-03-27 RX ORDER — ACETAMINOPHEN 325 MG/1
650 TABLET ORAL EVERY 6 HOURS PRN
Status: DISCONTINUED | OUTPATIENT
Start: 2025-03-27 | End: 2025-03-29 | Stop reason: HOSPADM

## 2025-03-27 RX ORDER — NALOXONE HCL 0.4 MG/ML
0.02 VIAL (ML) INJECTION
Status: DISCONTINUED | OUTPATIENT
Start: 2025-03-27 | End: 2025-03-29 | Stop reason: HOSPADM

## 2025-03-27 RX ORDER — MAGNESIUM SULFATE HEPTAHYDRATE 40 MG/ML
2 INJECTION, SOLUTION INTRAVENOUS ONCE
Status: COMPLETED | OUTPATIENT
Start: 2025-03-27 | End: 2025-03-27

## 2025-03-27 RX ORDER — POLYETHYLENE GLYCOL 3350 17 G/17G
17 POWDER, FOR SOLUTION ORAL 2 TIMES DAILY PRN
Status: DISCONTINUED | OUTPATIENT
Start: 2025-03-27 | End: 2025-03-29 | Stop reason: HOSPADM

## 2025-03-27 RX ORDER — BUPRENORPHINE AND NALOXONE 8; 2 MG/1; MG/1
1.5 FILM, SOLUBLE BUCCAL; SUBLINGUAL DAILY
COMMUNITY

## 2025-03-27 RX ORDER — CEFTRIAXONE 1 G/1
1 INJECTION, POWDER, FOR SOLUTION INTRAMUSCULAR; INTRAVENOUS
Status: DISCONTINUED | OUTPATIENT
Start: 2025-03-27 | End: 2025-03-29 | Stop reason: HOSPADM

## 2025-03-27 RX ORDER — TALC
6 POWDER (GRAM) TOPICAL NIGHTLY PRN
Status: DISCONTINUED | OUTPATIENT
Start: 2025-03-27 | End: 2025-03-29 | Stop reason: HOSPADM

## 2025-03-27 RX ORDER — IPRATROPIUM BROMIDE AND ALBUTEROL SULFATE 2.5; .5 MG/3ML; MG/3ML
3 SOLUTION RESPIRATORY (INHALATION) EVERY 4 HOURS PRN
Status: DISCONTINUED | OUTPATIENT
Start: 2025-03-27 | End: 2025-03-29 | Stop reason: HOSPADM

## 2025-03-27 RX ORDER — LORAZEPAM 2 MG/ML
0.5 INJECTION INTRAMUSCULAR
Status: COMPLETED | OUTPATIENT
Start: 2025-03-27 | End: 2025-03-27

## 2025-03-27 RX ORDER — ONDANSETRON HYDROCHLORIDE 2 MG/ML
4 INJECTION, SOLUTION INTRAVENOUS EVERY 4 HOURS PRN
Status: DISCONTINUED | OUTPATIENT
Start: 2025-03-27 | End: 2025-03-29 | Stop reason: HOSPADM

## 2025-03-27 RX ADMIN — ALBUTEROL SULFATE 2 PUFF: 90 AEROSOL, METERED RESPIRATORY (INHALATION) at 04:03

## 2025-03-27 RX ADMIN — MIRTAZAPINE 15 MG: 15 TABLET, FILM COATED ORAL at 08:03

## 2025-03-27 RX ADMIN — METHYLPREDNISOLONE SODIUM SUCCINATE 40 MG: 40 INJECTION, POWDER, FOR SOLUTION INTRAMUSCULAR; INTRAVENOUS at 06:03

## 2025-03-27 RX ADMIN — MAGNESIUM SULFATE HEPTAHYDRATE 2 G: 40 INJECTION, SOLUTION INTRAVENOUS at 01:03

## 2025-03-27 RX ADMIN — ALBUTEROL SULFATE 2 PUFF: 90 AEROSOL, METERED RESPIRATORY (INHALATION) at 09:03

## 2025-03-27 RX ADMIN — AZITHROMYCIN MONOHYDRATE 500 MG: 500 INJECTION, POWDER, LYOPHILIZED, FOR SOLUTION INTRAVENOUS at 08:03

## 2025-03-27 RX ADMIN — METHYLPREDNISOLONE SODIUM SUCCINATE 60 MG: 40 INJECTION, POWDER, FOR SOLUTION INTRAMUSCULAR; INTRAVENOUS at 02:03

## 2025-03-27 RX ADMIN — ENOXAPARIN SODIUM 40 MG: 40 INJECTION SUBCUTANEOUS at 04:03

## 2025-03-27 RX ADMIN — CEFTRIAXONE SODIUM 1 G: 1 INJECTION, POWDER, FOR SOLUTION INTRAMUSCULAR; INTRAVENOUS at 08:03

## 2025-03-27 RX ADMIN — LORAZEPAM 0.5 MG: 2 INJECTION INTRAMUSCULAR; INTRAVENOUS at 12:03

## 2025-03-27 RX ADMIN — BUPRENORPHINE 8 MG: 8 TABLET SUBLINGUAL at 12:03

## 2025-03-27 NOTE — ED PROVIDER NOTES
Encounter Date: 3/26/2025       History     Chief Complaint   Patient presents with    Shortness of Breath    Cough     Pt arrives [facility] from [facility] with hypoxia. Pt was 60% on RA, placed on 3L NC at 94%. C/o cough, SOB, & congestion x3 days. Recently dx with COPD not on home O2. Denies CP.      Shaniqua Rodriguez is a 64 y.o. female with a past medical history of HTN who presents to the ED for worsening cough, shortness of breath, congestion over the past 3 days.  She has recently exposed to the flu.  She is noted to have room air saturations at 60% per EMS, has had improvement after nasal cannula oxygen supplementation.         Review of patient's allergies indicates:   Allergen Reactions    Sulfa (sulfonamide antibiotics) Hives     No past medical history on file.  No past surgical history on file.  No family history on file.  Social History[1]  Review of Systems   Unable to perform ROS: Acuity of condition       Physical Exam     Initial Vitals [03/26/25 1732]   BP Pulse Resp Temp SpO2   (!) 149/54 90 (!) 26 98.8 °F (37.1 °C) (!) 94 %      MAP       --         Physical Exam    Nursing note and vitals reviewed.  Constitutional: She appears well-developed and well-nourished.   HENT:   Head: Normocephalic.   Eyes: Pupils are equal, round, and reactive to light.   Cardiovascular:  Regular rhythm.           Tachycardic   Pulmonary/Chest: She has wheezes. She has rhonchi.   Abdominal: Abdomen is soft. Bowel sounds are normal. She exhibits no distension.   Musculoskeletal:         General: Normal range of motion.     Neurological: She is alert and oriented to person, place, and time. GCS score is 15. GCS eye subscore is 4. GCS verbal subscore is 5. GCS motor subscore is 6.         ED Course   Critical Care    Date/Time: 3/27/2025 12:01 AM    Performed by: Jun Combs MD  Authorized by: Jun Combs MD  Direct patient critical care time: 45 minutes  Total critical care time (exclusive of procedural  time) : 45 minutes  Critical care time was exclusive of separately billable procedures and treating other patients.  Critical care was necessary to treat or prevent imminent or life-threatening deterioration of the following conditions: respiratory failure and sepsis.  Critical care was time spent personally by me on the following activities: development of treatment plan with patient or surrogate, discussions with consultants, evaluation of patient's response to treatment, obtaining history from patient or surrogate, examination of patient, ordering and performing treatments and interventions, ordering and review of laboratory studies, ordering and review of radiographic studies, pulse oximetry, re-evaluation of patient's condition and review of old charts.        Labs Reviewed   COMPREHENSIVE METABOLIC PANEL - Abnormal       Result Value    Sodium 139      Potassium 3.9      Chloride 96 (*)     CO2 29      Glucose 141 (*)     Blood Urea Nitrogen 26.6 (*)     Creatinine 0.79      Calcium 9.9      Protein Total 8.0 (*)     Albumin 2.9 (*)     Globulin 5.1 (*)     Albumin/Globulin Ratio 0.6 (*)     Bilirubin Total 0.3            ALT 14      AST 22      eGFR >60      Anion Gap 14.0      BUN/Creatinine Ratio 34     URINALYSIS, REFLEX TO URINE CULTURE - Abnormal    Color, UA Yellow      Appearance, UA Turbid (*)     Specific Gravity, UA 1.027      pH, UA 6.0      Protein, UA 2+ (*)     Glucose, UA Normal      Ketones, UA Negative      Blood, UA 2+ (*)     Bilirubin, UA Negative      Urobilinogen, UA 2.0 (*)     Nitrites, UA Negative      Leukocyte Esterase, UA Negative      RBC, UA 0-5      WBC, UA 0-5      Bacteria, UA None Seen      Squamous Epithelial Cells, UA Trace      Mucous, UA Trace (*)     Hyaline Casts, UA 11-20 (*)    CBC WITH DIFFERENTIAL - Abnormal    WBC 16.25 (*)     RBC 5.64 (*)     Hgb 17.5 (*)     Hct 54.0 (*)     MCV 95.7 (*)     MCH 31.0      MCHC 32.4 (*)     RDW 13.4      Platelet 217       MPV 8.8     MANUAL DIFFERENTIAL - Abnormal    WBC 16.25      Neutrophils % 86      Lymphs % 5      Monocytes % 9      Neutrophils Abs 13.975 (*)     Lymphs Abs 0.8125      Monocytes Abs 1.4625 (*)     Platelets Normal      RBC Morph Normal     LACTIC ACID, PLASMA - Abnormal    Lactic Acid Level 2.4 (*)    BLOOD GAS - Abnormal    Sample Type Arterial Blood      Sample site Right Brachial Artery      Drawn by  RRT      pH, Blood gas 7.230 (*)     pCO2, Blood gas 84.0 (*)     pO2, Blood gas 101.0 (*)     Sodium, Blood Gas 134 (*)     Potassium, Blood Gas 4.2      Calcium Level Ionized 1.20      TOC2, Blood gas 37.8      Base Excess, Blood gas 3.80 (*)     sO2, Blood gas 96.6      HCO3, Blood gas 35.2 (*)     THb, Blood gas 16.9 (*)     O2 Hb, Blood Gas 95.0      CO Hgb 2.9 (*)     Met Hgb 1.1      Allens Test Yes      Oxygen Device, Blood gas Oxy Mask      LPM 8     BLOOD GAS - Abnormal    Sample Type Arterial Blood      Sample site Right Brachial Artery      Drawn by  RRT      pH, Blood gas 7.310 (*)     pCO2, Blood gas 67.0 (*)     pO2, Blood gas 66.0 (*)     Sodium, Blood Gas 133 (*)     Potassium, Blood Gas 4.1      Calcium Level Ionized 1.22      TOC2, Blood gas 35.8      Base Excess, Blood gas 4.70 (*)     sO2, Blood gas 90.7      HCO3, Blood gas 33.7 (*)     THb, Blood gas 16.4 (*)     O2 Hb, Blood Gas 91.5 (*)     CO Hgb 3.0 (*)     Met Hgb 1.1      Allens Test No      MODE BiPAP      FIO2, Blood gas 35      IPAP 14      EPAP 5     LACTIC ACID, PLASMA - Normal    Lactic Acid Level 2.1     MAGNESIUM - Normal    Magnesium Level 2.10     COVID/RSV/FLU A&B PCR - Normal    Influenza A PCR Not Detected      Influenza B PCR Not Detected      Respiratory Syncytial Virus PCR Not Detected      SARS-CoV-2 PCR Not Detected      Narrative:     The Xpert Xpress SARS-CoV-2/FLU/RSV plus is a rapid, multiplexed real-time PCR test intended for the simultaneous qualitative detection and differentiation of SARS-CoV-2,  Influenza A, Influenza B, and respiratory syncytial virus (RSV) viral RNA in either nasopharyngeal swab or nasal swab specimens.         TSH - Normal    TSH 2.645     BLOOD CULTURE OLG   BLOOD CULTURE [facility]   CBC W/ AUTO DIFFERENTIAL    Narrative:     The following orders were created for panel order CBC auto differential.  Procedure                               Abnormality         Status                     ---------                               -----------         ------                     CBC with Differential[[id number]]       Abnormal            Final result               Manual Differential[[id number]]         Abnormal            Final result                 Please view results for these tests on the individual orders.   QUANTIFERON GOLD TB   B-TYPE NATRIURETIC PEPTIDE   TROPONIN I        ECG Results              EKG 12-lead (Final result)        Collection Time Result Time QRS Duration OHS QTC Calculation    03/26/25 17:33:25 03/26/25 18:03:43 88 481                     Final result by Interface, Lab In [facility] (03/26/25 18:03:50)                   Narrative:    Test Reason : Z13.6,    Vent. Rate : 105 BPM     Atrial Rate : 105 BPM     P-R Int : 134 ms          QRS Dur :  88 ms      QT Int : 364 ms       P-R-T Axes :  73  72  73 degrees    QTcB Int : 481 ms    Sinus tachycardia  Possible Left atrial enlargement  Borderline Abnormal ECG  No previous ECGs available  Confirmed by Chad Rossi ([id number]) on 3/26/2025 6:03:42 PM    Referred By:            Confirmed By: Chad Rossi                                  Imaging Results              CTA Chest Non-Coronary (PE Studies) (Preliminary result)  Result time 03/26/25 21:28:24      Preliminary result by Pablo Álvarez Jr., MD (03/26/25 21:28:24)                   Narrative:    START OF REPORT:  Technique: CT Scan of the chest was performed with intravenous contrast with direct axial images as well as sagittal and coronal reconstruction images pulmonary  embolus protocol.    Dosage Information: Automated Exposure Control was utilized 201.71 mGy.cm.    Comparison: None.    Clinical History: Suspected PE, cough, SOB.    Findings:  Soft Tissues: Unremarkable.  Lines and Tubes: None.  Neck: The visualized soft tissues of the neck appear unremarkable. The thyroid gland appear unremarkable.  Mediastinum: Multiple enlarged mediastinal lymph nodes are seen in the prevascular APwindow/subaortic paratracheal precarinal subcarinal and bilateral hilar spaces . The largest is in subcarinal space and measures 1.5 cm in least dimension.  Heart: The heart appears unremarkable.  Aorta: Mild aortic calcification is seen in the thoracic aorta.  Pulmonary Arteries: No filling defects are seen in the pulmonary arteries to suggest pulmonary embolus.  Lungs: Mild scattered streaky linear opacity is seen with peripheral predominance consistent with nonspecific dependent changes scarring and subsegmental atelectasis. There are moderate scattered reticulonodular densities in the bilateral lungs, with a predominance in the right lower lobe. This may reflect infectious process such as atypical pneumonia or mycobacteria infection with miliary TB not excluded.  Pleura: No effusions or pneumothorax are identified.  Bony Structures:  Spine: Mild multilevel spondylolytic changes are seen in the thoracic spine.  Ribs: No rib fractures are identified. The bilateral ribs appear unremarkable.      Impression:  1. Multiple enlarged mediastinal lymph nodes are seen in the prevascular APwindow/subaortic paratracheal precarinal subcarinal and bilateral hilar spaces .  2. No filling defects are seen in the pulmonary arteries to suggest pulmonary embolus.  3. There are moderate scattered reticulonodular densities in the bilateral lungs, with a predominance in the right lower lobe. This may reflect infectious process such as atypical pneumonia or mycobacteria infection with miliary TB not excluded. Correlate  with clinical and laboratory findings as regards additional evaluation and follow-up.  4. Details and other findings as discussed above.                                         X-Ray Chest AP Portable (Final result)  Result time 03/26/25 18:12:41      Final result by Dinesh Barriga MD (03/26/25 18:12:41)                   Impression:      No acute findings.      Electronically signed by: Dinesh Barriga  Date:    03/26/2025  Time:    18:12               Narrative:    EXAMINATION:  XR CHEST AP PORTABLE    CLINICAL HISTORY:  Sepsis;    COMPARISON:  21 January 2020    FINDINGS:  Frontal view of the chest was obtained. The heart is not significantly enlarged.  The lungs are hyperinflated with mild chronic changes but no dense consolidation.  No pneumothorax.                                       Medications   magnesium sulfate 2g in water 50mL IVPB (premix) (has no administration in time range)   lactated ringers bolus 1,770 mL (0 mLs Intravenous Stopped 3/26/25 1956)   cefTRIAXone injection 1 g (1 g Intravenous Given 3/26/25 1924)   azithromycin (ZITHROMAX) 500 mg in 0.9% NaCl 250 mL IVPB (admixture device) (0 mg Intravenous Stopped 3/26/25 2039)   albuterol nebulizer solution 10 mg (10 mg Nebulization Given 3/26/25 1924)   ipratropium 0.02 % nebulizer solution 0.5 mg (0.5 mg Nebulization Given 3/26/25 1925)   methylPREDNISolone sodium succinate injection 125 mg (125 mg Intravenous Given 3/26/25 1900)   iohexoL (OMNIPAQUE 350) injection 70 mL (70 mLs Intravenous Given 3/26/25 2053)   albuterol nebulizer solution 10 mg (10 mg Nebulization Given 3/26/25 2157)   LORazepam injection 0.5 mg (0.5 mg Intravenous Given 3/27/25 0019)     Medical Decision Making  Problems Addressed:  Atypical pneumonia: acute illness or injury  COPD exacerbation: acute illness or injury  Screening for cardiovascular condition: acute illness or injury  Sepsis, due to unspecified organism, unspecified whether acute organ dysfunction present: acute  illness or injury  SIRS (systemic inflammatory response syndrome): acute illness or injury    Amount and/or Complexity of Data Reviewed  Labs: ordered.  Radiology: ordered. Decision-making details documented in ED Course.    Risk  Prescription drug management.  Decision regarding hospitalization.      Differential diagnosis (includes but is not limited to):   COPD, pneumonia, viral URI, COVID, flu, aspiration, infection, sepsis, ACS, arrhythmia, electrolyte abnormalities, dehydration, kidney injury    MDM Narrative  64-year-old female presents for evaluation of slowly worsening cough and congestion over the past 2-3 days.  She is seen at an urgent care where she is noted to have oxygen saturations at 60% on room air.  She is placed on nasal cannula oxygen with some improvement and EMS was called to bring her to the emergency department.  EMS reports patient had improvement of oxygen saturations on 3 L by nasal cannula to approximately 94-96%.  Patient reporting ongoing cough and congestion with shortness of breath over the past 3 days.  Patient rapidly uptitrated to 10 L of oxygen by OxyMask.  Inhaled bronchodilators, steroids ordered.  EKG reviewed.  Chest x-ray reviewed.  CTA of the chest performed and is negative for PE but does show scattered densities concerning for atypical/multilobar pneumonia.  Sepsis fluids given, blood cultures ordered, lactic acid mildly elevated 2.4, broad-spectrum antibiotics with Rocephin and azithromycin ordered.  Continued inhaled bronchodilators.  Magnesium ordered.  Case discussed with hospitalist, recommends Quantiferon and airborne precautions until it results, will admit.    Dispo: Admit    My independent radiology interpretation: as above  Point of care US (independently performed and interpreted):   Decision rules/clinical scoring:     Sepsis Perfusion Assessment:     Amount and/or Complexity of Data Reviewed  Independent historian: EMS   Summary of history: report received on  arrival  External data reviewed: notes from previous ED visits and notes from clinic visits  Summary of data reviewed: Prior records reviewed  Risk and benefits of testing: discussed   Labs: ordered and reviewed  Radiology: ordered and independent interpretation performed (see above or ED course)  ECG/medicine tests: ordered and independent interpretation performed (see above or ED course)  Discussion of management or test interpretation with external provider(s): discussed with hospitalist physician   Summary of discussion: as above    Risk  Parenteral controlled substances   Drug therapy requiring intense monitoring for toxicity   Decision regarding hospitalization  Shared decision making     Critical Care  30-74 minutes     Data Reviewed/Counseling: I have personally reviewed the patient's vital signs, nursing notes, and other relevant tests, information, and imaging. I had a detailed discussion regarding the historical points, exam findings, and any diagnostic results supporting the discharge diagnosis. I personally performed the history, PE, MDM and procedures as documented above and agree with the scribe's documentation.    Portions of this note were dictated using voice recognition software. Although it was reviewed for accuracy, some inherent voice recognition errors may have occurred and may be present in this document.             ED Course as of 03/27/25 0027   Wed Mar 26, 2025   2113 X-Ray Chest AP Portable  Independently visualized/reviewed by me during the ED visit.  - No lobar consolidation or PTX []   2115 EKG independently interpreted by me.  EKG: ST @ 105, no STEMI, Qtc 481 []      ED Course User Index  [MC] Jun Combs MD                           Clinical Impression:  Final diagnoses:  [Z13.6] Screening for cardiovascular condition  [J44.1] COPD exacerbation (Primary)  [J18.9] Atypical pneumonia  [R65.10] SIRS (systemic inflammatory response syndrome)  [A41.9] Sepsis, due to  unspecified organism, unspecified whether acute organ dysfunction present          ED Disposition Condition    Admit Stable                    [1]         Jun Combs MD  03/27/25 0027

## 2025-03-27 NOTE — H&P
[facility] Hospital Medicine History & Physical Examination       Patient Name: Shaniqua Rodriguez  MRN: [id number]  Patient Class: IP- Inpatient   Admission Date: 3/26/2025  5:35 PM  Length of Stay: 0  Admitting Service: Hospital Medicine   Attending Physician: Fred Osorio MD   Primary Care Provider: No primary care provider on file.  History source: EMR, patient and/or patient's family    CHIEF COMPLAINT   Shortness of Breath and Cough (Pt arrives [facility] from [facility] with hypoxia. Pt was 60% on RA, placed on 3L NC at 94%. C/o cough, SOB, & congestion x3 days. Recently dx with COPD not on home O2. Denies CP. )      HISTORY OF PRESENT ILLNESS:   Ms. Rodriguez is a 64 year old female with a pmh of HTN and COPD who presented to the ED with c/o SOB, cough for the last few days.  She states that she was recently diagnosed with COPD, not on chronic home O2.  She states that was exposed to the flu recently. Denies any chest pain, unusual swelling. States has had intermittent fever/chills. Denies cardiac history.    ED vitals on arrival:  Temp 98.8° F, pulse 90, resp 26, /54, SpO2 94% on 3 L NC.  Today's ED lab work revealed WBC 16.25, H&H 17.5/54, glucose 141, lactic acid 2.1 > 2.4. Flu/Covid/RSV negative.  ABG showed pH 7.23, pCO2 84.  Placed on BiPAP.  Repeat ABG 2 hours later showed pH 7.31, pCO2 67.  CXR showed no acute findings.  CTA chest PE protocol showed multiple enlarged mediastinal nodes are seen in the prevascular AP window/subaortic paratracheal precarinal subcarinal and bilateral hilar spaces.  No filling defects were seen in the pulmonary arteries to suggest PE.  There were moderate scattered reticulonodular densities in the bilateral lungs, with a prominence in the right lower lobe.  This may reflect infectious process such as atypical pneumonia or mycobacteria infection, familiar to be not excluded.  She was given duo nebs, Solu-Medrol, and started on ceftriaxone and  "azithromycin in the ED.  She was admitted to Hospital Medicine for management.    PAST MEDICAL HISTORY:     Hypertension   COPD    PAST SURGICAL HISTORY:   Denies previous surgery    ALLERGIES:   Sulfa (sulfonamide antibiotics)    FAMILY HISTORY:   Reviewed and non-contributory     SOCIAL HISTORY:   Screening for Social Drivers for health: Patient screened for food insecurity, housing instability, transportation needs, utility   difficulties, and interpersonal safety (select all that apply as identified as concern)  []Housing or Food  []Transportation Needs  []Utility Difficulties  []Interpersonal safety  [x]None  Social History     Tobacco Use    Smoking status: Every Day     Types: Cigarettes    Smokeless tobacco: Not on file   Substance Use Topics    Alcohol use: Yes        HOME MEDICATIONS:     Prior to Admission medications    Not on File       REVIEW OF SYSTEMS:   Except as documented, all other systems reviewed and negative     PHYSICAL EXAM:   T 98.6 °F (37 °C)   /74   P 85   RR (!) 30   O2 (!) 92 %  GENERAL: awake, alert, oriented and in no acute distress, non-toxic appearing   HEENT: normocephalic atraumatic   NECK: supple   LUNGS: Wheezing bilaterally, no accessory muscle use. Currently on BiPap  CVS: Regular rate and rhythm, normal peripheral perfusion  ABD: Soft, non-tender, non-distended, bowel sounds present  EXTREMITIES: no clubbing or cyanosis  SKIN: Warm, dry.   NEURO: alert and oriented, grossly without focal deficits   PSYCHIATRIC: Cooperative    LABS AND IMAGING:     Recent Labs     03/26/25  1823 03/27/25  0339   WBC 16.25  16.25* 11.10   RBC 5.64* 5.03   HGB 17.5* 15.6   HCT 54.0* 48.3*   MCV 95.7* 96.0*   MCH 31.0 31.0   MCHC 32.4* 32.3*   RDW 13.4 13.5    199     No results for input(s): "LACTIC" in the last 72 hours.  No results for input(s): "INR", "APTT", "D-DIMER" in the last 72 hours.  No results for input(s): "HGBA1C", "CHOL", "TRIG", "LDL", "VLDL", "HDL" in the last 72 " hours.   Recent Labs     03/26/25  1823 03/27/25  0339    140   K 3.9 4.3   CO2 29 33*   BUN 26.6* 24.6*   CREATININE 0.79 0.66   GLUCOSE 141* 191*   CALCIUM 9.9 8.8   MG 2.10 2.70*   PHOS  --  2.8   ALBUMIN 2.9* 2.6*   GLOBULIN 5.1* 3.4   ALKPHOS 132 120   ALT 14 15   AST 22 19   BILITOT 0.3 0.2   TSH 2.645  --      Recent Labs     03/27/25  0116   .9*   TROPONINI <0.010          CTA Chest Non-Coronary (PE Studies)  START OF REPORT:  Technique: CT Scan of the chest was performed with intravenous contrast with direct axial images as well as sagittal and coronal reconstruction images pulmonary embolus protocol.    Dosage Information: Automated Exposure Control was utilized 201.71 mGy.cm.    Comparison: None.    Clinical History: Suspected PE, cough, SOB.    Findings:  Soft Tissues: Unremarkable.  Lines and Tubes: None.  Neck: The visualized soft tissues of the neck appear unremarkable. The thyroid gland appear unremarkable.  Mediastinum: Multiple enlarged mediastinal lymph nodes are seen in the prevascular APwindow/subaortic paratracheal precarinal subcarinal and bilateral hilar spaces . The largest is in subcarinal space and measures 1.5 cm in least dimension.  Heart: The heart appears unremarkable.  Aorta: Mild aortic calcification is seen in the thoracic aorta.  Pulmonary Arteries: No filling defects are seen in the pulmonary arteries to suggest pulmonary embolus.  Lungs: Mild scattered streaky linear opacity is seen with peripheral predominance consistent with nonspecific dependent changes scarring and subsegmental atelectasis. There are moderate scattered reticulonodular densities in the bilateral lungs, with a predominance in the right lower lobe. This may reflect infectious process such as atypical pneumonia or mycobacteria infection with miliary TB not excluded.  Pleura: No effusions or pneumothorax are identified.  Bony Structures:  Spine: Mild multilevel spondylolytic changes are seen in the  thoracic spine.  Ribs: No rib fractures are identified. The bilateral ribs appear unremarkable.    Impression:  1. Multiple enlarged mediastinal lymph nodes are seen in the prevascular APwindow/subaortic paratracheal precarinal subcarinal and bilateral hilar spaces .  2. No filling defects are seen in the pulmonary arteries to suggest pulmonary embolus.  3. There are moderate scattered reticulonodular densities in the bilateral lungs, with a predominance in the right lower lobe. This may reflect infectious process such as atypical pneumonia or mycobacteria infection with miliary TB not excluded. Correlate with clinical and laboratory findings as regards additional evaluation and follow-up.  4. Details and other findings as discussed above.  X-Ray Chest AP Portable  Narrative: EXAMINATION:  XR CHEST AP PORTABLE    CLINICAL HISTORY:  Sepsis;    COMPARISON:  21 January 2020    FINDINGS:  Frontal view of the chest was obtained. The heart is not significantly enlarged.  The lungs are hyperinflated with mild chronic changes but no dense consolidation.  No pneumothorax.  Impression: No acute findings.    Electronically signed by: Dinesh Barriga  Date:    03/26/2025  Time:    18:12      ASSESSMENT & PLAN:     Sepsis  Acute hypoxemic/hypercapneic respiratory failure  COPD exacerbation  Community acquired pneumonia contributing to all of the above  Elevated BNP without history of CHF  Elevated blood glucose without history of DM    History:  HTN, COPD    Plan:  -Azithromycin 500 mg IV q 24 hours  -Ceftriaxone 1 g IV q  24 hours  -Solu-medrol 40 mg IV q 12 hours  -Duonebs PRN  -Echo pending  -Repeat ABG pending  -Telemetry monitoring  -O2 delivery as needed  -Monitor WBC  -Blood cultures x 2 pending  -Antihypertensives as needed  -Resume home meds as appropriate  -Labs in AM - CBC, CMP, Mag, Phos      DVT prophylaxis: SCDs, Lovenox  Code status: Full    I, Julisa Arevalo NP have reviewed and discussed this case with   Devon.  Please see addendum for further assessment and plan from attending MD.

## 2025-03-28 LAB
OHS QRS DURATION: 84 MS
OHS QTC CALCULATION: 462 MS
TROPONIN I SERPL-MCNC: <0.01 NG/ML (ref 0–0.04)

## 2025-03-28 PROCEDURE — 25000003 PHARM REV CODE 250: Performed by: NURSE PRACTITIONER

## 2025-03-28 PROCEDURE — 63600175 PHARM REV CODE 636 W HCPCS: Performed by: NURSE PRACTITIONER

## 2025-03-28 PROCEDURE — 94760 N-INVAS EAR/PLS OXIMETRY 1: CPT

## 2025-03-28 PROCEDURE — 93005 ELECTROCARDIOGRAM TRACING: CPT

## 2025-03-28 PROCEDURE — 63600175 PHARM REV CODE 636 W HCPCS: Performed by: STUDENT IN AN ORGANIZED HEALTH CARE EDUCATION/TRAINING PROGRAM

## 2025-03-28 PROCEDURE — 25000242 PHARM REV CODE 250 ALT 637 W/ HCPCS: Performed by: NURSE PRACTITIONER

## 2025-03-28 PROCEDURE — 99900035 HC TECH TIME PER 15 MIN (STAT)

## 2025-03-28 PROCEDURE — 94640 AIRWAY INHALATION TREATMENT: CPT

## 2025-03-28 PROCEDURE — 25000003 PHARM REV CODE 250: Performed by: INTERNAL MEDICINE

## 2025-03-28 PROCEDURE — 27000207 HC ISOLATION

## 2025-03-28 PROCEDURE — 36415 COLL VENOUS BLD VENIPUNCTURE: CPT

## 2025-03-28 PROCEDURE — 63600175 PHARM REV CODE 636 W HCPCS: Mod: JZ,TB | Performed by: INTERNAL MEDICINE

## 2025-03-28 PROCEDURE — 21400001 HC TELEMETRY ROOM

## 2025-03-28 PROCEDURE — 93010 ELECTROCARDIOGRAM REPORT: CPT | Mod: ,,, | Performed by: INTERNAL MEDICINE

## 2025-03-28 PROCEDURE — 27000221 HC OXYGEN, UP TO 24 HOURS

## 2025-03-28 PROCEDURE — 25000003 PHARM REV CODE 250: Performed by: STUDENT IN AN ORGANIZED HEALTH CARE EDUCATION/TRAINING PROGRAM

## 2025-03-28 PROCEDURE — 84484 ASSAY OF TROPONIN QUANT: CPT

## 2025-03-28 PROCEDURE — 99900031 HC PATIENT EDUCATION (STAT)

## 2025-03-28 RX ORDER — GUAIFENESIN 600 MG/1
1200 TABLET, EXTENDED RELEASE ORAL 2 TIMES DAILY
Status: DISCONTINUED | OUTPATIENT
Start: 2025-03-28 | End: 2025-03-29 | Stop reason: HOSPADM

## 2025-03-28 RX ADMIN — GUAIFENESIN 1200 MG: 600 TABLET, EXTENDED RELEASE ORAL at 10:03

## 2025-03-28 RX ADMIN — AZITHROMYCIN MONOHYDRATE 500 MG: 500 INJECTION, POWDER, LYOPHILIZED, FOR SOLUTION INTRAVENOUS at 09:03

## 2025-03-28 RX ADMIN — IPRATROPIUM BROMIDE AND ALBUTEROL SULFATE 3 ML: .5; 3 SOLUTION RESPIRATORY (INHALATION) at 05:03

## 2025-03-28 RX ADMIN — ALBUTEROL SULFATE 2 PUFF: 90 AEROSOL, METERED RESPIRATORY (INHALATION) at 05:03

## 2025-03-28 RX ADMIN — ALBUTEROL SULFATE 2 PUFF: 90 AEROSOL, METERED RESPIRATORY (INHALATION) at 08:03

## 2025-03-28 RX ADMIN — GUAIFENESIN 1200 MG: 600 TABLET, EXTENDED RELEASE ORAL at 08:03

## 2025-03-28 RX ADMIN — ALBUTEROL SULFATE 2 PUFF: 90 AEROSOL, METERED RESPIRATORY (INHALATION) at 09:03

## 2025-03-28 RX ADMIN — MIRTAZAPINE 15 MG: 15 TABLET, FILM COATED ORAL at 08:03

## 2025-03-28 RX ADMIN — BUPRENORPHINE 8 MG: 8 TABLET SUBLINGUAL at 09:03

## 2025-03-28 RX ADMIN — ALBUTEROL SULFATE 2 PUFF: 90 AEROSOL, METERED RESPIRATORY (INHALATION) at 12:03

## 2025-03-28 RX ADMIN — METHYLPREDNISOLONE SODIUM SUCCINATE 60 MG: 40 INJECTION, POWDER, FOR SOLUTION INTRAMUSCULAR; INTRAVENOUS at 09:03

## 2025-03-28 RX ADMIN — CEFTRIAXONE SODIUM 1 G: 1 INJECTION, POWDER, FOR SOLUTION INTRAMUSCULAR; INTRAVENOUS at 06:03

## 2025-03-28 RX ADMIN — ENOXAPARIN SODIUM 40 MG: 40 INJECTION SUBCUTANEOUS at 05:03

## 2025-03-28 NOTE — PROGRESS NOTES
[facility] - 5th Floor [facility] MEDICINE ~ PROGRESS NOTE        CHIEF COMPLAINT   Hospital follow up    HOSPITAL COURSE   Ms. Rodriguez is a 64 year old female with a pmh of HTN and COPD who presented to the ED with c/o SOB, cough for the last few days.  She states that she was recently diagnosed with COPD, not on chronic home O2.  She states that was exposed to the flu recently. Denies any chest pain, unusual swelling. States has had intermittent fever/chills. Denies cardiac history.     ED vitals on arrival:  Temp 98.8° F, pulse 90, resp 26, /54, SpO2 94% on 3 L NC.  Today's ED lab work revealed WBC 16.25, H&H 17.5/54, glucose 141, lactic acid 2.1 > 2.4. Flu/Covid/RSV negative.  ABG showed pH 7.23, pCO2 84.  Placed on BiPAP.  Repeat ABG 2 hours later showed pH 7.31, pCO2 67.  CXR showed no acute findings.  CTA chest PE protocol showed multiple enlarged mediastinal nodes are seen in the prevascular AP window/subaortic paratracheal precarinal subcarinal and bilateral hilar spaces.  No filling defects were seen in the pulmonary arteries to suggest PE.  There were moderate scattered reticulonodular densities in the bilateral lungs, with a prominence in the right lower lobe.  This may reflect infectious process such as atypical pneumonia or mycobacteria infection, familiar to be not excluded.  She was given duo nebs, Solu-Medrol, and started on ceftriaxone and azithromycin in the ED.  She was admitted to Hospital Medicine for management.    Today  Seen and examined this morning.  She is still requiring supplemental oxygen and off of oxygen she is satting about 81%.  She has a productive sounding cough now.        OBJECTIVE/PHYSICAL EXAM     VITAL SIGNS (MOST RECENT):  Temp: 98 °F (36.7 °C) (03/28/25 0736)  Pulse: 75 (03/28/25 0920)  Resp: (!) 22 (03/28/25 0920)  BP: 122/64 (03/28/25 0736)  SpO2: (!) 91 % (03/28/25 0736) VITAL SIGNS (24 HOUR RANGE):  Temp:  [97.9 °F (36.6 °C)-98.7 °F (37.1  °C)] 98 °F (36.7 °C)  Pulse:  [62-87] 75  Resp:  [15-27] 22  SpO2:  [90 %-99 %] 91 %  BP: (109-155)/() 122/64   GENERAL: In no acute distress, afebrile  HEENT:  CHEST: Clear to auscultation bilaterally  HEART: S1, S2, no appreciable murmur  ABDOMEN: Soft, nontender, BS +  MSK: Warm, no lower extremity edema, no clubbing or cyanosis  NEUROLOGIC: Alert and oriented x4, moving all extremities with good strength   INTEGUMENTARY:  PSYCHIATRY:        ASSESSMENT/PLAN   Acute rhino virus pneumonitis   Acute hypoxemic respiratory failure with hypercapnic respiratory failure   COPD exacerbation 2/2 above   Chronic opioid therapy with Suboxone  Smoker        With the Respiratory panel being positive for rhino virus that is likely the cause of the reticulonodular appearance on CT scan.  Does not need TB workup as she has no exposure and no risk factors.  Albuterol 4 times daily 2 puffs, Solu-Medrol 60 daily.  Rocephin azithromycin but less likely bacterial.  Wean oxygen and BiPAP as tolerated   Encouraged to stop smoking  Plan for discharge once we are able to wean her off of the oxygen hopefully in the next day or so    DVT prophylaxis:     Anticipated discharge and disposition:   __________________________________________________________________________    NUTRITIONAL STATUS     Patient meets ASPEN criteria for   malnutrition of   per RD assessment as evidenced by:                       A minimum of two characteristics is recommended for diagnosis of either severe or non-severe malnutrition.     LABS/MICRO/MEDS/DIAGNOSTICS       LABS  Recent Labs     03/27/25  0339      K 4.3   CO2 33*   BUN 24.6*   CREATININE 0.66   GLUCOSE 191*   CALCIUM 8.8   ALKPHOS 120   AST 19   ALT 15   ALBUMIN 2.6*     Recent Labs     03/27/25  0339   WBC 11.10   RBC 5.03   HCT 48.3*   MCV 96.0*          MICROBIOLOGY  Microbiology Results (last 7 days)       Procedure Component Value Units Date/Time    Blood culture x two cultures.  Draw prior to antibiotics. [[id number]]  (Normal) Collected: 03/26/25 1907    Order Status: Completed Specimen: Blood Updated: 03/27/25 2001     Blood Culture No Growth At 24 Hours    Blood culture x two cultures. Draw prior to antibiotics. [[id number]]  (Normal) Collected: 03/26/25 1907    Order Status: Completed Specimen: Blood Updated: 03/27/25 2001     Blood Culture No Growth At 24 Hours    AFB Smear [[id number]]     Order Status: Canceled Specimen: Sputum     Mycobacteria and Nocardia Culture [[id number]]     Order Status: Canceled Specimen: Respiratory                MEDICATIONS   albuterol  2 puff Inhalation QID    azithromycin  500 mg Intravenous Q24H    buprenorphine HCL  8 mg Sublingual Daily    cefTRIAXone (Rocephin) IV (PEDS and ADULTS)  1 g Intravenous Q24H    enoxparin  40 mg Subcutaneous Daily    guaiFENesin  1,200 mg Oral BID    methylPREDNISolone injection (PEDS and ADULTS)  60 mg Intravenous Daily    mirtazapine  15 mg Oral QHS    pneumoc 20-rito conj-dip cr(PF)  0.5 mL Intramuscular 1 time in Clinic/HOD         INFUSIONS         DIAGNOSTIC TESTS  X-Ray Abdomen AP 1 View   Final Result      Nonobstructive bowel gas pattern.         Electronically signed by: Chon Taylor   Date:    03/27/2025   Time:    12:24      CTA Chest Non-Coronary (PE Studies)   Final Result      No evidence of pulmonary arterial embolism.      Extensive tree-in-bud opacities in the right greater than left lung most likely infectious in etiology.  Favored infectious etiologies include viral, fungal, or tuberculosis or non tuberculosis mycobacterial infection.  Aspiration bronchiolitis is also included in the differential diagnosis but thought to be less likely.      Scattered mediastinal and bilateral hilar lymph nodes which are favored to be infectious or inflammatory in etiology.      Scarring of the right lung apex.      The partially imaged superior abdomen demonstrates findings suggestive of the wide-mouth ventral  abdominal hernia likely containing a loop of colon.  Correlate clinically for any abnormal abdominal symptomatology and recommend further evaluation with KUB.  Depending on the clinical signs and symptoms and KUB, a CT abdomen and pelvis may be warranted.      Findings were discussed with Dr. Osorio at 11:18 on 03/27/2025.         Electronically signed by: Florentin Rodriguez   Date:    03/27/2025   Time:    11:20      X-Ray Chest AP Portable   Final Result      No acute findings.         Electronically signed by: Dinesh Barriga   Date:    03/26/2025   Time:    18:12           Echo  Result Date: 3/27/2025    Left Ventricle: The left ventricle is smaller than normal. There is   mild concentric hypertrophy. There is normal systolic function with a   visually estimated ejection fraction of 65 - 70%. Grade I diastolic   dysfunction.    Right Ventricle: The right ventricle is normal in size. Systolic   function is normal.    Aortic Valve: The aortic valve is a trileaflet valve. Mildly calcified   cusps.    Mitral Valve: Moderately thickened leaflets. There is mild to moderate   regurgitation.    Tricuspid Valve: There is mild regurgitation.    IVC/SVC: Intermediate venous pressure at 8 mmHg.               Case related differential diagnoses have been reviewed; assessment and plan has been documented. I have personally reviewed the labs and test results that are currently available; I have reviewed the patients medication list. I have reviewed the consulting providers recommendations. I have reviewed or attempted to review medical records based upon their availability.  All of the patient's and/or family's questions have been addressed and answered to the best of my ability.  I will continue to monitor closely and make adjustments to medical management as needed.  This document was created using M*Modal Fluency Direct.  Transcription errors may have been made.  Please contact me if any questions may rise regarding documentation to  clarify transcription.        Fred Osorio MD   Internal Medicine  Department of Hospital Medicine  [facility] - 5th Floor Med Surg

## 2025-03-28 NOTE — PLAN OF CARE
Problem: Adult Inpatient Plan of Care  Goal: Plan of Care Review  Outcome: Progressing  Goal: Patient-Specific Goal (Individualized)  Outcome: Progressing  Goal: Absence of Hospital-Acquired Illness or Injury  Outcome: Progressing  Goal: Optimal Comfort and Wellbeing  Outcome: Progressing  Goal: Readiness for Transition of Care  Outcome: Progressing     Problem: Sepsis/Septic Shock  Goal: Optimal Coping  Outcome: Progressing  Goal: Absence of Bleeding  Outcome: Progressing  Goal: Blood Glucose Level Within Targeted Range  Outcome: Progressing  Goal: Absence of Infection Signs and Symptoms  Outcome: Progressing  Goal: Optimal Nutrition Intake  Outcome: Progressing     Problem: Pneumonia  Goal: Fluid Balance  Outcome: Progressing  Goal: Resolution of Infection Signs and Symptoms  Outcome: Progressing  Goal: Effective Oxygenation and Ventilation  Outcome: Progressing     Problem: Infection  Goal: Absence of Infection Signs and Symptoms  Outcome: Progressing

## 2025-03-28 NOTE — PROGRESS NOTES
Inpatient Nutrition Assessment    Admit Date: 3/26/2025   Total duration of encounter: 2 days   Patient Age: 64 y.o.    Nutrition Recommendation/Prescription     Continue Regular diet.  Add Boost Plus chocolate daily to provide 360 kcal and 14 g protein per serving.     Communication of Recommendations: reviewed with patient    Nutrition Assessment     Chart Review    Reason Seen: continuous nutrition monitoring    Malnutrition Screening Tool Results   Have you recently lost weight without trying?: No  Have you been eating poorly because of a decreased appetite?: No   MST Score: 0   Diagnosis:  Acute rhino virus pneumonitis   Acute hypoxemic respiratory failure with hypercapnic respiratory failure   COPD exacerbation 2/2 above   Chronic opioid therapy with Suboxone      Relevant Medical History: HTN, COPD     Scheduled Medications:  albuterol, 2 puff, QID  azithromycin, 500 mg, Q24H  buprenorphine HCL, 8 mg, Daily  cefTRIAXone (Rocephin) IV (PEDS and ADULTS), 1 g, Q24H  enoxparin, 40 mg, Daily  guaiFENesin, 1,200 mg, BID  methylPREDNISolone injection (PEDS and ADULTS), 60 mg, Daily  mirtazapine, 15 mg, QHS  pneumoc 20-rito conj-dip cr(PF), 0.5 mL, 1 time in Clinic/HOD    Continuous Infusions:   PRN Medications:  acetaminophen, 650 mg, Q6H PRN  albuterol-ipratropium, 3 mL, Q4H PRN  aluminum-magnesium hydroxide-simethicone, 30 mL, QID PRN  hydrALAZINE, 10 mg, Q4H PRN  influenza, 0.5 mL, vaccine x 1 dose  melatonin, 6 mg, Nightly PRN  naloxone, 0.02 mg, PRN  ondansetron, 4 mg, Q4H PRN  polyethylene glycol, 17 g, BID PRN  prochlorperazine, 5 mg, Q6H PRN  simethicone, 1 tablet, QID PRN    Calorie Containing IV Medications: no significant kcals from medications at this time    Recent Labs   Lab 03/26/25  1823 03/27/25  0339    140   K 3.9 4.3   CALCIUM 9.9 8.8   PHOS  --  2.8   MG 2.10 2.70*   CL 96* 99   CO2 29 33*   BUN 26.6* 24.6*   CREATININE 0.79 0.66   EGFRNORACEVR >60 >60   GLUCOSE 141* 191*   BILITOT 0.3 0.2  "  ALKPHOS 132 120   ALT 14 15   AST 22 19   ALBUMIN 2.9* 2.6*   WBC 16.25  16.25* 11.10   HGB 17.5* 15.6   HCT 54.0* 48.3*     Nutrition Orders:  Diet Adult Regular      Appetite/Oral Intake: good/% of meals  Factors Affecting Nutritional Intake: none identified  Social Needs Impacting Access to Food: none identified  Food/Scientologist/Cultural Preferences: none reported  Food Allergies: no known food allergies  Last Bowel Movement: 25  Wound(s):  none noted    Comments    3/28/25 Patient reports good oral intake of meals. Denies any nausea, vomiting or recent unintentional wt loss. Noted BMI 16.4. Reports its hard for her to keep wt on. Patient willing to drink oral nutrition supplement in chocolate flavor daily.     Anthropometrics    Height: (P) 5' 9" (175.3 cm), Height Method: (P) Stated  Last Weight: (P) 50.6 kg (111 lb 8 oz) (25 184),    BMI (Calculated): (P) 16.5  BMI Classification: underweight (BMI less than 18.5)     Ideal Body Weight (IBW), Female: (P) 145 lb     % Ideal Body Weight, Female (lb): (P) 76.9 %                    Usual Body Weight (UBW), k.27 kg  % Usual Body Weight: 96.96     Usual Weight Provided By: patient    Wt Readings from Last 5 Encounters:   25 (P) 50.6 kg (111 lb 8 oz)   10/28/19 65.8 kg (145 lb 1 oz)     Weight Change(s) Since Admission: .  Wt Readings from Last 1 Encounters:   25 (P) 50.6 kg (111 lb 8 oz)   252 59 kg (130 lb)   Admit Weight: 59 kg (130 lb) (25 173),      Patient Education     Not applicable.    Nutrition Goals & Monitoring     Dietitian will monitor: energy intake and weight  Discharge planning: continue Regular diet with Boost/Ensure oral supplements  Nutrition Risk/Follow-Up: low (follow-up in 5-7 days)   Please consult if re-assessment needed sooner.     "

## 2025-03-28 NOTE — NURSING
Austin a noise from patient's room, when checked patient found sitting on the floor beside her bed. Patient stated trying to go the bathroom and feels weak, les gave way. Reported she did not hit her head, no loss of consciousness. Assessed  and no injury noted, assisted back to bed, vital signs taken as documented/stable. Patient reminded to call for assistance which fully understood. Supervisor and Hospitalist informed, stat EKG and troponin ordered, falls care plan updated and additional safety precaution ( bed alarm) put in place, explained to patient and fully understood, SOS completed.

## 2025-03-29 VITALS
HEIGHT: 67 IN | TEMPERATURE: 98 F | SYSTOLIC BLOOD PRESSURE: 166 MMHG | WEIGHT: 130 LBS | BODY MASS INDEX: 20.4 KG/M2 | OXYGEN SATURATION: 92 % | RESPIRATION RATE: 20 BRPM | DIASTOLIC BLOOD PRESSURE: 76 MMHG | HEART RATE: 76 BPM

## 2025-03-29 PROCEDURE — 63600175 PHARM REV CODE 636 W HCPCS: Mod: JZ,TB | Performed by: INTERNAL MEDICINE

## 2025-03-29 PROCEDURE — 94760 N-INVAS EAR/PLS OXIMETRY 1: CPT

## 2025-03-29 PROCEDURE — 99900035 HC TECH TIME PER 15 MIN (STAT)

## 2025-03-29 PROCEDURE — 99900031 HC PATIENT EDUCATION (STAT)

## 2025-03-29 PROCEDURE — 27000221 HC OXYGEN, UP TO 24 HOURS

## 2025-03-29 PROCEDURE — 25000003 PHARM REV CODE 250: Performed by: INTERNAL MEDICINE

## 2025-03-29 RX ORDER — ALBUTEROL SULFATE 90 UG/1
2 INHALANT RESPIRATORY (INHALATION) 4 TIMES DAILY PRN
Qty: 18 G | Refills: 0 | Status: SHIPPED | OUTPATIENT
Start: 2025-03-29 | End: 2026-03-29

## 2025-03-29 RX ORDER — PREDNISONE 20 MG/1
40 TABLET ORAL DAILY
Qty: 10 TABLET | Refills: 0 | Status: SHIPPED | OUTPATIENT
Start: 2025-03-29 | End: 2025-04-03

## 2025-03-29 RX ORDER — ALBUTEROL SULFATE 90 UG/1
2 INHALANT RESPIRATORY (INHALATION) 4 TIMES DAILY PRN
Status: DISCONTINUED | OUTPATIENT
Start: 2025-03-29 | End: 2025-03-29 | Stop reason: HOSPADM

## 2025-03-29 RX ORDER — FLUTICASONE FUROATE, UMECLIDINIUM BROMIDE AND VILANTEROL TRIFENATATE 100; 62.5; 25 UG/1; UG/1; UG/1
1 POWDER RESPIRATORY (INHALATION) DAILY
Qty: 28 EACH | Refills: 0 | Status: SHIPPED | OUTPATIENT
Start: 2025-03-29

## 2025-03-29 RX ORDER — AMOXICILLIN AND CLAVULANATE POTASSIUM 500; 125 MG/1; MG/1
1 TABLET, FILM COATED ORAL 3 TIMES DAILY
Qty: 6 TABLET | Refills: 0 | Status: SHIPPED | OUTPATIENT
Start: 2025-03-29

## 2025-03-29 RX ORDER — IPRATROPIUM BROMIDE AND ALBUTEROL SULFATE 2.5; .5 MG/3ML; MG/3ML
3 SOLUTION RESPIRATORY (INHALATION)
Status: DISCONTINUED | OUTPATIENT
Start: 2025-03-29 | End: 2025-03-29 | Stop reason: HOSPADM

## 2025-03-29 RX ADMIN — BUPRENORPHINE 8 MG: 8 TABLET SUBLINGUAL at 08:03

## 2025-03-29 RX ADMIN — ALBUTEROL SULFATE 2 PUFF: 90 INHALANT RESPIRATORY (INHALATION) at 08:03

## 2025-03-29 RX ADMIN — METHYLPREDNISOLONE SODIUM SUCCINATE 60 MG: 40 INJECTION, POWDER, FOR SOLUTION INTRAMUSCULAR; INTRAVENOUS at 08:03

## 2025-03-29 RX ADMIN — GUAIFENESIN 1200 MG: 600 TABLET, EXTENDED RELEASE ORAL at 08:03

## 2025-03-29 NOTE — DISCHARGE SUMMARY
[facility] - 5th Floor Med Surg  [facility] MEDICINE - DISCHARGE SUMMARY    Patient Name: Shaniqua Rodriguez  MRN: [id number]  Admission Date: 3/26/2025  Discharge Date: 03/29/2025  Hospital Length of Stay: 2 days  Discharge Provider: Fred Osorio MD  Primary Care Provider: No primary care provider on file.      HOSPITAL COURSE   Ms. Rodriguez is a 64 year old female with a pmh of HTN and COPD who presented to the ED with c/o SOB, cough for the last few days.  She states that she was recently diagnosed with COPD, not on chronic home O2.  She states that was exposed to the flu recently. Denies any chest pain, unusual swelling. States has had intermittent fever/chills. Denies cardiac history.     ED vitals on arrival:  Temp 98.8° F, pulse 90, resp 26, /54, SpO2 94% on 3 L NC.  Today's ED lab work revealed WBC 16.25, H&H 17.5/54, glucose 141, lactic acid 2.1 > 2.4. Flu/Covid/RSV negative.  ABG showed pH 7.23, pCO2 84.  Placed on BiPAP.  Repeat ABG 2 hours later showed pH 7.31, pCO2 67.  CXR showed no acute findings.  CTA chest PE protocol showed multiple enlarged mediastinal nodes are seen in the prevascular AP window/subaortic paratracheal precarinal subcarinal and bilateral hilar spaces.  No filling defects were seen in the pulmonary arteries to suggest PE.  There were moderate scattered reticulonodular densities in the bilateral lungs, with a prominence in the right lower lobe.  This may reflect infectious process such as atypical pneumonia or mycobacteria infection, familiar to be not excluded.  She was given duo nebs, Solu-Medrol, and started on ceftriaxone and azithromycin in the ED.  She was admitted to Hospital Medicine for management.       Essentially admitted for sob and found to have rhinovirus which was the cuase of her sob.  Ct showed tree in bud opacities from viral pna.    She was treated for copd exacerbation and also abx was initiated.  She will complete course of abx but less likely  bacterial.  Dc on steroids.   She was adamant about going home today, she has been presecribed home o2.  Also prescribed her trelegy for copd.  Needs pft as outpatient once she gets better.         PHYSICAL EXAM     Most Recent Vital Signs:  Temp: 97.9 °F (36.6 °C) (03/29/25 1132)  Pulse: 76 (03/29/25 1132)  Resp: 20 (03/29/25 0840)  BP: (!) 166/76 (03/29/25 1132)  SpO2: (!) 92 % (03/29/25 1132)   GENERAL: In no acute distress, afebrile  HEENT:  CHEST: Clear to auscultation bilaterally  HEART: S1, S2, no appreciable murmur  ABDOMEN: Soft, nontender, BS +  MSK: Warm, no lower extremity edema, no clubbing or cyanosis  NEUROLOGIC: Alert and oriented x4, moving all extremities with good strength   INTEGUMENTARY:  PSYCHIATRY:          DISCHARGE DIAGNOSIS   Acute rhino virus pneumonitis   Acute hypoxemic respiratory failure with hypercapnic respiratory failure   COPD exacerbation 2/2 above   Chronic opioid therapy with Suboxone  Smoker        _____________________________________________________________________________      DISCHARGE MED REC     Discharge Medication List as of 3/29/2025 11:27 AM        START taking these medications    Details   albuterol (PROVENTIL/VENTOLIN HFA) 90 mcg/actuation inhaler Inhale 2 puffs into the lungs 4 (four) times daily as needed for Shortness of Breath. Rescue, Starting Sat 3/29/2025, Until Sun 3/29/2026 at 2359, Normal      amoxicillin-clavulanate 500-125mg (AUGMENTIN) 500-125 mg Tab Take 1 tablet (500 mg total) by mouth 3 (three) times daily., Starting Sat 3/29/2025, Normal      fluticasone-umeclidin-vilanter (TRELEGY ELLIPTA) 100-62.5-25 mcg DsDv Inhale 1 puff into the lungs once daily., Starting Sat 3/29/2025, Normal      predniSONE (DELTASONE) 20 MG tablet Take 2 tablets (40 mg total) by mouth once daily. for 5 days, Starting Sat 3/29/2025, Until Thu 4/3/2025, Normal           CONTINUE these medications which have NOT CHANGED    Details   buprenorphine-naloxone 8-2 mg (SUBOXONE)  8-2 mg Place 1.5 Film under the tongue once daily., Historical Med      mirtazapine (REMERON) 15 MG tablet Take 15 mg by mouth every evening., Historical Med                CONSULTS     Consults (From admission, onward)          Status Ordering Provider     Inpatient consult to Social Work/Case Management  Once        Provider:  (Not yet assigned)    Acknowledged ERICKA SAGE              FOLLOW UP      Follow-up Information       Kate Almeida MD Follow up in 1 week(s).    Specialty: Internal Medicine  Contact information:  [address] [place] [address] [address]  [place] [id number] [phone]               [facility] - Follow up.    Specialty: DME Provider  Why: Home oxygen provider  Contact information:  [address]  [place] [address]  [phone]                                 DISCHARGE INSTRUCTIONS     Explained in detail to the patient about the discharge plan, medications, and follow-up visits. Pt understands and agrees with the treatment plan.  Discharged Condition: stable  Diet as tolerated  Activities as tolerated  Discharge to: Home or Self Care    TIME SPENT ON DISCHARGE   35 minutes        Ericka Sage MD  Internal Medicine  Department of Hospital Medicine  [facility] - 5th Floor Med Surg      This document was created using electronic dictation services.  Please excuse any errors that may have been made.  Contact me if any questions regarding documentation to clarify verbiage.

## 2025-03-29 NOTE — NURSING
Patient's O2 saturation dropped to 78% while ambulating to the toilet, slightly breathless, took at least 3 minutes for saturation to reached to 90's on 2L/NC. Patient re-educated the importance of calling when ambulating or needed help.Patient understood.

## 2025-03-29 NOTE — NURSING
I did a home oxygen evaluation of this patient this morning. Dr. Osorio requested that I check her O2 level on room air for possible discharge. The patient was found on 2 L nasal cannula with an O2 sat of 87%. After removing the oxygen, the patient quickly dropped to 78% on room air. I determined it was unsafe to have her attempt activity at that point and applied oxygen. The patient recovered to 93% O2 on 3 L nasal cannula. With ambulation, the patient maintained and O2 sat of 89% on 3 L.

## 2025-03-29 NOTE — PROGRESS NOTES
Patient discharged home with her son. [facility] delivered O2 and she went home on 3 ltr. I provided the AVS and educated the patient and her son. Patient will  a pulse oximeter at [facility] on the way home, when collecting her prescriptions.

## 2025-03-29 NOTE — PLAN OF CARE
Patient will be discharged to home with oxygen. Spoke to Yumiko with [employer] and gave her patient information. Nurse placed note in record and Yumiko will set up oxygen.

## 2025-03-30 LAB
GAMMA INTERFERON BACKGROUND BLD IA-ACNC: 0.01 IU/ML
M TB IFN-G BLD-IMP: ABNORMAL
M TB IFN-G CD4+ BCKGRND COR BLD-ACNC: -0.01 IU/ML
M TB IFN-G CD4+CD8+ BCKGRND COR BLD-ACNC: -0.01 IU/ML
MITOGEN IGNF BCKGRD COR BLD-ACNC: 0.44 IU/ML

## 2025-03-31 ENCOUNTER — PATIENT OUTREACH (OUTPATIENT)
Dept: ADMINISTRATIVE | Facility: CLINIC | Age: 65
End: 2025-03-31
Payer: COMMERCIAL

## 2025-03-31 LAB
BACTERIA BLD CULT: NORMAL
BACTERIA BLD CULT: NORMAL

## 2025-03-31 NOTE — PROGRESS NOTES
C3 nurse attempted to contact Shaniqua Rodriguez for a TCC post hospital discharge follow up call. No answer. Left voicemail with callback information. The patient does not have a scheduled HOSFU appointment, and the pt does not have an [facility] PCP.

## 2025-04-01 ENCOUNTER — PATIENT MESSAGE (OUTPATIENT)
Dept: ADMINISTRATIVE | Facility: CLINIC | Age: 65
End: 2025-04-01
Payer: COMMERCIAL

## 2025-04-01 NOTE — PROGRESS NOTES
2nd attempt-C3 nurse attempted to contact Shaniqua Rodriguez for a TCC post hospital discharge follow up call. No answer. Left voicemail with callback information. The patient does not have a scheduled HOSFU appointment, and the pt does not have an [facility] PCP.

## 2025-04-01 NOTE — PROGRESS NOTES
C3 nurse attempted to contact Shaniqua Rodriguez, after receiving a return call message. No answer. Left voicemail with Callback information and OOC#, again.

## 2025-04-01 NOTE — PROGRESS NOTES
3rd attempt-C3 nurse attempted to contact Shaniqua Rodriguez for a TCC post hospital discharge follow up call. No answer. Left voicemail with callback information, and OOC#. The patient does not have a scheduled HOSFU appointment, and the pt does not have an [facility] PCP.

## 2025-07-02 ENCOUNTER — PATIENT OUTREACH (OUTPATIENT)
Dept: ADMINISTRATIVE | Facility: HOSPITAL | Age: 65
End: 2025-07-02
Payer: COMMERCIAL

## 2025-07-02 LAB — CRC RECOMMENDATION EXT: NORMAL
